# Patient Record
Sex: FEMALE | Race: WHITE
[De-identification: names, ages, dates, MRNs, and addresses within clinical notes are randomized per-mention and may not be internally consistent; named-entity substitution may affect disease eponyms.]

---

## 2017-02-16 ENCOUNTER — HOSPITAL ENCOUNTER (OUTPATIENT)
Dept: HOSPITAL 10 - OBT | Age: 27
Discharge: HOME | End: 2017-02-16
Attending: SPECIALIST
Payer: COMMERCIAL

## 2017-02-16 VITALS — DIASTOLIC BLOOD PRESSURE: 78 MMHG | SYSTOLIC BLOOD PRESSURE: 125 MMHG | HEART RATE: 111 BPM | RESPIRATION RATE: 18 BRPM

## 2017-02-16 VITALS
WEIGHT: 190.48 LBS | BODY MASS INDEX: 34.18 KG/M2 | BODY MASS INDEX: 34.18 KG/M2 | HEIGHT: 62.5 IN | HEIGHT: 62.5 IN | WEIGHT: 190.48 LBS

## 2017-02-16 DIAGNOSIS — Z3A.37: ICD-10-CM

## 2017-02-16 DIAGNOSIS — O26.893: ICD-10-CM

## 2017-02-16 DIAGNOSIS — N89.8: ICD-10-CM

## 2017-02-16 DIAGNOSIS — Z87.891: ICD-10-CM

## 2017-02-16 DIAGNOSIS — O21.2: Primary | ICD-10-CM

## 2017-02-16 LAB
ADD UMIC: YES
BACTERIA #/AREA URNS HPF: (no result) /[HPF]
BASOPHILS # BLD AUTO: 0 10^3/UL (ref 0–0.1)
BASOPHILS NFR BLD: 0.3 % (ref 0–2)
COLOR UR: YELLOW
CONDITION: 1
EOSINOPHIL # BLD: 0 10^3/UL (ref 0–0.5)
EOSINOPHIL NFR BLD: 0.1 % (ref 0–7)
ERYTHROCYTE [DISTWIDTH] IN BLOOD BY AUTOMATED COUNT: 13.2 % (ref 11.5–14.5)
GLUCOSE UR STRIP-MCNC: NEGATIVE %
HCT VFR BLD CALC: 34.3 % (ref 37–47)
HGB BLD-MCNC: 11.6 G/DL (ref 12–16)
KETONES UR STRIP.AUTO-MCNC: NEGATIVE MG/DL
LYMPHOCYTES # BLD AUTO: 2.4 10^3/UL (ref 0.8–2.9)
LYMPHOCYTES NFR BLD AUTO: 25.3 % (ref 15–51)
MCH RBC QN AUTO: 31 PG (ref 29–33)
MCHC RBC AUTO-ENTMCNC: 33.9 G/DL (ref 32–37)
MCV RBC AUTO: 91.6 FL (ref 82–101)
MONOCYTES # BLD: 0.7 10^3/UL (ref 0.3–0.9)
MONOCYTES NFR BLD: 7 % (ref 0–11)
MUCOUS THREADS #/AREA URNS HPF: (no result) /[HPF]
NEUTROPHILS # BLD: 6.5 10^3/UL (ref 1.6–7.5)
NEUTROPHILS NFR BLD AUTO: 67.3 % (ref 39–77)
NITRITE UR QL STRIP.AUTO: NEGATIVE
NRBC # BLD MANUAL: 0 10^3/UL (ref 0–0)
NRBC BLD QL: 0 /100WBC (ref 0–0)
PLATELET # BLD: 164 10^3/UL (ref 140–440)
PMV BLD AUTO: 11.3 FL (ref 7.4–10.4)
RBC # BLD AUTO: 3.75 10^6/UL (ref 4.2–5.4)
RBC # UR AUTO: (no result) /UL
SQUAMOUS #/AREA URNS HPF: (no result) /[HPF]
URINE BILIRUBIN (DIP): NEGATIVE
URINE TOTAL PROTEIN (DIP): (no result)
UROBILINOGEN UR STRIP-ACNC: (no result) (ref 0.1–1)
WBC # BLD AUTO: 9.7 10^3/UL (ref 4.8–10.8)
WBC # BLD: 9.7 10^3/UL (ref 4.8–10.8)
WBC # UR STRIP: (no result) /UL

## 2017-02-16 PROCEDURE — 85025 COMPLETE CBC W/AUTO DIFF WBC: CPT

## 2017-02-16 PROCEDURE — 81001 URINALYSIS AUTO W/SCOPE: CPT

## 2017-02-16 PROCEDURE — 76818 FETAL BIOPHYS PROFILE W/NST: CPT

## 2017-02-16 PROCEDURE — G0463 HOSPITAL OUTPT CLINIC VISIT: HCPCS

## 2017-02-16 PROCEDURE — 36415 COLL VENOUS BLD VENIPUNCTURE: CPT

## 2017-02-16 PROCEDURE — 81003 URINALYSIS AUTO W/O SCOPE: CPT

## 2017-02-16 PROCEDURE — 84112 EVAL AMNIOTIC FLUID PROTEIN: CPT

## 2017-02-16 NOTE — CONS
Date/Time of Note


Date/Time of Note


DATE: 17 


TIME: 19:16





Consultation Date/Type/Reason


Admit Date/Time


 2017


Triage consult


This patient is a 26 years old  3 para 2 with EDC of 2070 which 

makes her 37 weeks and 4 days


She came to OB triage with main complaint nausea vomiting chills watery 

diarrhea and pressure in her abdomen for about 2 days


On examination her blood pressure was 125/78 pulse rate 111 temperature 98.3 

fetal heart rate about 145-150 on physical examination she was afebrile and no 

clear evidence of upper respiratory tract infection however due to vaginal 

discharge that she was complaining ROM plus was performed was negative her CBC 

was within normal urinalysis was negative ultrasound performed


Her biophysical profile biophysical profile with 88 with MATTHEW of 10.5 cm 


IV hydration was given.  Patient feels much better and option of going down to 

emergency room for further workup or going home was presented she decided not 

to go to emergency room and go home she will be seen by her physician 2 days


 Laboratory Tests








Test


  17


15:30 17


17:00


 


Urine Bacteria MODERATE  


 


Urine Bilirubin NEGATIVE  


 


Urine Clarity


  SLIGHTLY


CLOUDY 


 


 


Urine Color YELLOW  


 


Urine Glucose NEGATIVE%  


 


Urine Hemoglobin 2+  


 


Urine Ketones NEGATIVE  


 


Urine Leukocyte Esterase TRACE  


 


Urine Microscopic RBC 10-25/HPF  


 


Urine Microscopic WBC 0-2/HPF  


 


Urine Mucus FEW  


 


Urine Nitrite NEGATIVE  


 


Urine Specific Gravity 1.020  


 


Urine Squamous Epithelial


Cells MODERATE 


  


 


 


Urine Total Protein TRACE  


 


Urine Urobilinogen 0.2  E.U./dL  


 


Urine pH 6.5  


 


Basophils #  0.010^3/ul 


 


Basophils %  0.3% 


 


Blood Morphology Comment   


 


Eosinophils #  0.010^3/ul 


 


Eosinophils %  0.1% 


 


Fetal Membranes Rupture  NEGATIVE 


 


Hematocrit  34.3% 


 


Hemoglobin  11.6g/dl 


 


Lymphocytes #  2.410^3/ul 


 


Lymphocytes %  25.3% 


 


Mean Corpuscular Hemoglobin  31.0pg 


 


Mean Corpuscular Hemoglobin


Concent 


  33.9g/dl 


 


 


Mean Corpuscular Volume  91.6fl 


 


Mean Platelet Volume  11.3fl 


 


Monocytes #  0.710^3/ul 


 


Monocytes %  7.0% 


 


Neutrophils #  6.510^3/ul 


 


Neutrophils %  67.3% 


 


Nucleated Red Blood Cells #  0.010^3/ul 


 


Nucleated Red Blood Cells %  0.0/100WBC 


 


Platelet Count  50718^3/UL 


 


Red Blood Count  3.7510^6/ul 


 


Red Cell Distribution Width  13.2% 


 


White Blood Count  9.710^3/ul 








Constitutional:  no complaints


Eyes:  no complaints


ENT:  no complaints


Respiratory:  no complaints


Cardiovascular:  no complaints


Gastrointestinal:  diarrhea, pain, vomiting


Genitourinary:  no complaints


Musculoskeletal:  no complaints


Skin:  no complaints


Neurologic:  no complaints


Endocrine:  no complaints


Lymphatic:  no complaints


Psychological:  no complaints


Immunologic:  no complaints





Social History


Smoking Status:  Former smoker





Exam/Review of Systems


Vital Signs


Vitals





 Vital Signs








  Date Time  Temp Pulse Resp B/P Pulse Ox O2 Delivery O2 Flow Rate FiO2


 


17 16:12 98.3 111 18 125/78  Room Air  











Results


Result Diagram:  


17 1700





Results 24 hrs





Laboratory Tests








Test


  17


15:30 17


17:00


 


Urine Bacteria MODERATE   


 


Urine Bilirubin NEGATIVE   


 


Urine Clarity


  SLIGHTLY


CLOUDY 


 


 


Urine Color YELLOW   


 


Urine Glucose NEGATIVE   


 


Urine Hemoglobin 2+  H 


 


Urine Ketones NEGATIVE   


 


Urine Leukocyte Esterase TRACE  H 


 


Urine Microscopic RBC 10-25   


 


Urine Microscopic WBC 0-2   


 


Urine Mucus FEW   


 


Urine Nitrite NEGATIVE   


 


Urine Specific Gravity 1.020   


 


Urine Squamous Epithelial


Cells MODERATE  


  


 


 


Urine Total Protein TRACE   


 


Urine Urobilinogen 0.2  E.U./dL   


 


Urine pH 6.5   


 


Basophils #  0.0  


 


Basophils %  0.3  


 


Blood Morphology Comment    


 


Eosinophils #  0.0  


 


Eosinophils %  0.1  


 


Fetal Membranes Rupture  NEGATIVE  


 


Hematocrit  34.3  L


 


Hemoglobin  11.6  L


 


Lymphocytes #  2.4  


 


Lymphocytes %  25.3  


 


Mean Corpuscular Hemoglobin  31.0  


 


Mean Corpuscular Hemoglobin


Concent 


  33.9  


 


 


Mean Corpuscular Volume  91.6  


 


Mean Platelet Volume  11.3  H


 


Monocytes #  0.7  


 


Monocytes %  7.0  


 


Neutrophils #  6.5  


 


Neutrophils %  67.3  


 


Nucleated Red Blood Cells #  0.0  


 


Nucleated Red Blood Cells %  0.0  


 


Platelet Count  164  #


 


Red Blood Count  3.75  L


 


Red Cell Distribution Width  13.2  


 


White Blood Count  9.7  

















SHANTELL ESPANA MD 2017 19:25

## 2017-02-16 NOTE — RADRPT
PROCEDURE:   US OB biophysical profile. 

 

CLINICAL INDICATION:    decreased fetal movements, leaking fluid 

 

TECHNIQUE:   Multiple sonographic images of the pelvis were obtained.  The images were reviewed on a
 PACS workstation. 

 

COMPARISON:   09/22/2016 

 

FINDINGS:

 

There is a single viable intrauterine gestation.  Cardiac activity is present with 123 beats per min
[place]. 

There is a vertex presentation. 

The placenta is anterior.   There is no evidence of placental abruption.

There is a normal amount of amniotic fluid with an MATTHEW = 10.5 cm.

 

Biophysical profile:

Fetal movement 2/2

Fetal tone 2/2.

Fetal breathing 2/2 

MATTHEW 2/2

 

Total 8/8 

 

RPTAT: AA . 

 

IMPRESSION:

Normal biophysical profile.  

 .

_____________________________________________ 

.Alonso George MD, MD           Date    Time 

Electronically viewed and signed by .Alonso George MD, MD on 02/16/2017 17:09 

 

D:  02/16/2017 17:09  T:  02/16/2017 17:09

.S/

## 2017-02-16 NOTE — TRIAGE
===================================

OB Triage

===================================

Datetime Report Generated by CPN: 02/16/2017 20:01

   

   

===========================

Datetime: 02/16/2017 18:42

===========================

   

 Stage of Pregnancy:  OB Triage

   

===================================

Labor Evaluation

===================================

   

 Frequency:  0

 Monitor Mode:  External

 Resting Tone [place]:  Relaxed

   

===================================

Fetal Heart Rate

===================================

   

 FHR Baseline Rate:  120

 Monitor Mode:  External US

 FHR Baseline Changes:  No Baseline Change

 Variability:  Moderate 6-25 bpm

 Accelerations:  15X15

 Decelerations:  None

 Category:  Category I

   

===========================

Datetime: 02/16/2017 18:29

===========================

   

 Stage of Pregnancy:  OB Triage

   

===========================

Datetime: 02/16/2017 18:00

===========================

   

 Stage of Pregnancy:  OB Triage

   

===================================

Labor Evaluation

===================================

   

 Frequency:  0

 Monitor Mode:  External

 Resting Tone [place]:  Relaxed

   

===================================

Fetal Heart Rate

===================================

   

 FHR Baseline Rate:  135

 Monitor Mode:  External US

 FHR Baseline Changes:  No Baseline Change

 Variability:  Moderate 6-25 bpm

 Accelerations:  15X15

 Decelerations:  None

 Category:  Category I

   

===========================

Datetime: 02/16/2017 17:14

===========================

   

   

===================================

Vaginal Exam

===================================

   

 Dilatation (cms):  0.0

 Effacement (%):  50

 Station:  -2

 Exam By:  PAUL LIN

 Vaginal Bleeding:  None

 Cervix, Consistency:  Firm

 Cervix, Position:  Posterior

 Fetal Presentation 'A':  Cephalic

   

===========================

Datetime: 02/16/2017 17:05

===========================

   

 Stage of Pregnancy:  OB Triage

   

===========================

Datetime: 02/16/2017 16:56

===========================

   

 Stage of Pregnancy:  OB Triage

   

===================================

Labor Evaluation

===================================

   

 Frequency:  0

 Monitor Mode:  External

 Resting Tone [place]:  Relaxed

   

===================================

Fetal Heart Rate

===================================

   

 FHR Baseline Rate:  135

 Variability:  Moderate 6-25 bpm

 Accelerations:  15X15

 Decelerations:  None

 Category:  Category I

   

===========================

Datetime: 02/16/2017 16:36

===========================

   

 Stage of Pregnancy:  OB Triage

   

===========================

Datetime: 02/16/2017 16:02

===========================

   

 EGA:  37.4

 Arrived From:  Home

 Fetal Movement:  Present

 Contractions:  Denies/Absent

 Rupture of Membranes:  Unsure

 Vaginal Bleeding:  None

 Vaginal Discharge:  Denies

 Recent Sexual Intercouse:  Denies

 Abdominal Trauma:  Not Applicable

 Patient Complaints:  Nausea; Vomiting

 Provider Notified:  FOROOHAR

   

===========================

Datetime: 02/16/2017 16:01

===========================

   

 Time of Arrival:  02/16/2017 15:21

 Arrived By:  Ambulatory

 Arrived From:  Home

 Chief Complaint:  N/V, CHILLS, DIARRHEA, PRESSURE, LEAKING

 Fetal Movement:  Present

 Contractions:  Denies/Absent

 Rupture of Membranes:  Unsure

 Vaginal Bleeding:  None

 Vaginal Discharge:  Denies

 Recent Sexual Intercouse:  Denies

 Abdominal Trauma:  Not Applicable

 Patient Complaints:  Nausea; Vomiting; Other

 Additional Patient Complaints:  hx of Acid Reflux disease

 Provider Notified:  MALORIE

 Initial Plan:  VS, EFM, ROM+, UA, CBC, BPP

   

===========================

Datetime: 02/16/2017 16:00

===========================

   

 Assessment Type:  Triage

   

===================================

Maternal Assessment

===================================

   

 Level of Consciousness:  Fully Conscious

 DTR's/Clonus:  DTRs 2+; No Clonus

 Headache:  Denies

 Blurred Vision:  No

 Respiratory Effort:  Unlabored

 Breath Sounds, Left:  Clear and Equal

 Breath Sounds, Right:  Clear and Equal

 Nausea/Vomiting:  Present

 RUQ Epigastric Pain:  Denies

 Lower Extremities Edema:  Bilateral Lower Extremities

     Degree:  1+

 Upper Extremities Edema:  None

     Degree:  None

 Facial Edema:  None

   

===================================

Fall Risk Assessment

===================================

   

 History of Falling:  (0) No

 Secondary Diagnosis:  (15) Yes (Annotations: ACID REFLUX DISEASE)

 Ambulatory Aid:  (0) Bedrest/Nurse Assist

 IV Therapy:  (0) No

 Gait:  (0) Normal/Bedrest/Immobile

 Mental Status:  (0) Oriented to Own Ability

 Fall Score:  15

 Fall Risk Score Definition:  No Risk: No action required

   

===========================

Datetime: 02/16/2017 15:55

===========================

   

 Stage of Pregnancy:  OB Triage

 Monitor Mode:  External

 Monitor Mode:  External US

## 2017-02-27 ENCOUNTER — HOSPITAL ENCOUNTER (INPATIENT)
Dept: HOSPITAL 10 - OBT | Age: 27
LOS: 3 days | Discharge: HOME | End: 2017-03-02
Attending: OBSTETRICS & GYNECOLOGY | Admitting: OBSTETRICS & GYNECOLOGY
Payer: COMMERCIAL

## 2017-02-27 VITALS
HEIGHT: 62 IN | BODY MASS INDEX: 34.61 KG/M2 | HEIGHT: 62 IN | WEIGHT: 188.05 LBS | BODY MASS INDEX: 34.61 KG/M2 | WEIGHT: 188.05 LBS

## 2017-02-27 DIAGNOSIS — Z3A.39: ICD-10-CM

## 2017-02-27 PROCEDURE — 36415 COLL VENOUS BLD VENIPUNCTURE: CPT

## 2017-02-27 PROCEDURE — 86901 BLOOD TYPING SEROLOGIC RH(D): CPT

## 2017-02-27 PROCEDURE — 85610 PROTHROMBIN TIME: CPT

## 2017-02-27 PROCEDURE — 86592 SYPHILIS TEST NON-TREP QUAL: CPT

## 2017-02-27 PROCEDURE — 85730 THROMBOPLASTIN TIME PARTIAL: CPT

## 2017-02-27 PROCEDURE — G0463 HOSPITAL OUTPT CLINIC VISIT: HCPCS

## 2017-02-27 PROCEDURE — 90715 TDAP VACCINE 7 YRS/> IM: CPT

## 2017-02-27 PROCEDURE — 85025 COMPLETE CBC W/AUTO DIFF WBC: CPT

## 2017-02-27 PROCEDURE — 62319: CPT

## 2017-02-27 PROCEDURE — 86900 BLOOD TYPING SEROLOGIC ABO: CPT

## 2017-02-28 VITALS — DIASTOLIC BLOOD PRESSURE: 65 MMHG | SYSTOLIC BLOOD PRESSURE: 128 MMHG | RESPIRATION RATE: 18 BRPM | HEART RATE: 78 BPM

## 2017-02-28 VITALS — DIASTOLIC BLOOD PRESSURE: 72 MMHG | RESPIRATION RATE: 18 BRPM | HEART RATE: 48 BPM | SYSTOLIC BLOOD PRESSURE: 129 MMHG

## 2017-02-28 LAB
ADD SCAN DIFF: NO
APTT BLD: 26.4 SEC (ref 25–35)
BASOPHILS # BLD AUTO: 0 10^3/UL (ref 0–0.1)
BASOPHILS NFR BLD: 0.2 % (ref 0–2)
EOSINOPHIL # BLD: 0.1 10^3/UL (ref 0–0.5)
EOSINOPHIL NFR BLD: 0.6 % (ref 0–7)
ERYTHROCYTE [DISTWIDTH] IN BLOOD BY AUTOMATED COUNT: 13 % (ref 11.5–14.5)
HCT VFR BLD CALC: 35.5 % (ref 37–47)
HGB BLD-MCNC: 11.9 G/DL (ref 12–16)
INR PPP: 0.9
LYMPHOCYTES # BLD AUTO: 3 10^3/UL (ref 0.8–2.9)
LYMPHOCYTES NFR BLD AUTO: 33.8 % (ref 15–51)
MCH RBC QN AUTO: 30.8 PG (ref 29–33)
MCHC RBC AUTO-ENTMCNC: 33.5 G/DL (ref 32–37)
MCV RBC AUTO: 92 FL (ref 82–101)
MONOCYTES # BLD: 0.6 10^3/UL (ref 0.3–0.9)
MONOCYTES NFR BLD: 6.2 % (ref 0–11)
NEUTROPHILS # BLD: 5.3 10^3/UL (ref 1.6–7.5)
NEUTROPHILS NFR BLD AUTO: 58.9 % (ref 39–77)
NRBC # BLD MANUAL: 0 10^3/UL (ref 0–0)
NRBC BLD QL: 0 /100WBC (ref 0–0)
PLATELET # BLD: 153 10^3/UL (ref 140–415)
PMV BLD AUTO: 13 FL (ref 7.4–10.4)
PROTHROMBIN TIME: 12.1 SEC (ref 12.2–14.2)
PT RATIO: 0.9
RBC # BLD AUTO: 3.86 10^6/UL (ref 4.2–5.4)
WBC # BLD AUTO: 9 10^3/UL (ref 4.8–10.8)

## 2017-02-28 PROCEDURE — 4A1HX4Z MONITORING OF PRODUCTS OF CONCEPTION, CARDIAC ELECTRICAL ACTIVITY, EXTERNAL APPROACH: ICD-10-PCS | Performed by: SPECIALIST

## 2017-02-28 RX ADMIN — Medication SCH MLS/HR: at 21:12

## 2017-02-28 RX ADMIN — PYRIDOXINE HYDROCHLORIDE SCH MLS/HR: 100 INJECTION, SOLUTION INTRAMUSCULAR; INTRAVENOUS at 15:31

## 2017-02-28 RX ADMIN — PYRIDOXINE HYDROCHLORIDE SCH MLS/HR: 100 INJECTION, SOLUTION INTRAMUSCULAR; INTRAVENOUS at 02:20

## 2017-02-28 RX ADMIN — Medication SCH MLS/HR: at 22:00

## 2017-02-28 RX ADMIN — PYRIDOXINE HYDROCHLORIDE SCH MLS/HR: 100 INJECTION, SOLUTION INTRAMUSCULAR; INTRAVENOUS at 08:27

## 2017-02-28 NOTE — HP
Date/Time of Note


Date/Time of Note


DATE: 17 


TIME: 03:01





OB - History


Hx of Present Pregnancy


Free Text/Dictation


26 Year-old  with SIUP at 39 weeks presents with a chief complaint of 

uterine contractions.  She was seen at [place] last night and her exam was 2-3/

60/-2 there. She has been receiving her prenatal care with an OB clinic.  She 

states good fetal movement.  She denies nausea, vomiting, shortness of breath, 

chest pain, and abdominal pain between contractions, headache, visual changes, 

vaginal bleeding or LOF.


Chief Complaint:  uterine contractions


:  3


Para:  2


Spontaneous :  0


Therapeutic :  0


Prenatal Care:  Good Care


Ultrasounds:  Normal mid trimester US


Obstetrical Complications:  None


Medical Complications:  None





Past Family/Social History


*


Past Medical, Surgical, Family and Obstetric Histories reviewed from prenatal 

chart.


Blood Type:  O-


Rubella:  immune


RPR/VDRL:  Negative


GBS Status:  Negative


HBsAG:  Negative





OB  Admission Exam


Vital Signs


Vital Signs





 Vital Signs








  Date Time  Temp Pulse Resp B/P Pulse Ox O2 Delivery O2 Flow Rate FiO2


 


17 00:31 97.6 48 18 129/72  Room Air  











Physical Exam


HEENT:  WNL


Heart:  Rhythm Normal


Lungs:  Clear


Abdomen:  WNL


Extremities:  Normal


Cervical Dilatation:  4cm


Effacement:  75%


Station:  -2


Membranes:  Intact


Fetal Heart Rate:  140's


Accelerations:  Accelerations Present


Decelerations:  No Decelerations


Varibility:  Moderate


Contractions on Admission:  < 5 Minutes Apart


Intensity:  Moderate


Last 72 hours Lab Results


 CBC & BMP


17 01:55











OB  Assessment/Plan


Other plan:


26 Year-old  with SIUP at 39 weeks in labor.  


- FHR: No sign of fetal metabolic acidosis- Category I


- Continious EFM, toco


- CBC, blood type and screen


- Analgesia options with R/B/A discussed in detail with patient


- Epidural per patient request


- Please see the orders


- O neg/Rubella: Immune/GBS negative  





Admission, procedures, expectations, risks and possible complications have been 

discussed in detail with the patient. Risk of vaginal delivery including but 

not limited to bleeding, infection, cervical laceration, placental retention, 

injury to fetus, blood transfusion, blood transfusion related infection, risk 

of anesthesia, adhesion, cervical laceration, episiotomy/laceration, possible 

 delivery with risk of bleeding, infection, injury to other organs  (

bowel, bladder, ureter, vessels, nerves), injury to fetus, blood transfusion, 

blood transfusion related infection, risk of anesthesia, scar and hernia 

formation, needs for future , removal of uterus or any other indicated 

surgery discussed with the patient. She expressed understanding and repeats the 

risks. All of her questions were answered; all appropriate consents will be 

signed.





PHYSICIAN'S VERIFICATION OF INFORMED CONSENT:


The patient was counseled regarding the procedure, its indications, risks, 

potential complications and alternatives and any questions were answered. 

Consent was obtained.





PLANNED PROCEDURE/TREATMENT:  


Vaginal delivery with possible vacuum/forceps delivery episiotomy, repair of 

laceration possible  delivery





PHYSICIAN'S VERIFICATION OF INFORMED CONSENT FOR BLOOD TRANSFUSION:


There is a reasonable possibility that blood transfusion will be necessary as a 

result of the patient's procedure.  I have discussed the following with the 

patient/patient's legal representative:  An explanation of the benefits and 

risks of the transfusion of blood or blood products and the possible 

alternatives.  Al questions have been answered to the patient's/patients legal 

representatives satisfaction.


INFORMED CONSENT: The patient has been informed of:


- The nature of the proposed care, treatment, services, medic- Potential 

benefits, risks or side effects, including potential problems related to 

recuperation.


- The likelihood of achieving care treatment and service goals.


- Reasonable alternatives to the proposed care, treatment and service.


- The relevant risks, benefits and side effects related to alternatives, 

including the possible results of not receiving care, treatment and services.


- When indicated, any limitations on the confidentiality of information learned 

from or about the patient.


- If appropriate, the risks, benefits and alternatives of the drugs to be used 

for sedation/analgesia including moderate sedation.


- If appropriate, patient has been provided information on the risks, benefits 

and alternatives to the transfusion of blood and/or blood products.











PATRICIA FRANCO 2017 03:06

## 2017-02-28 NOTE — LDN
Date/Time of Note


Date/Time of Note


DATE: 17 


TIME: 17:27





Delivery Summary


2017


Delivery note


This patient is a 26 years old  3 para 2 with the estimated date of 

confinement of 2017.


She was admitted late last night in early labor and made gradual progress to 

complete dilatation under epidural block and Pitocin augmentation and had an 

spontaneous vaginal delivery 20 minutes ago the  was female with Apgar 

score of 9 and 1 minute 9 at 5 minutes weight of the baby was 6 pounds and 10 

admission with occiput posterior position mothers blood type is O- we will be 

performing a test of the blood group of the baby as well


Placenta Delivered:  Spontaneously


Meconium:  none


Perineum intact?:  Yes


Anesthesia type:  Epidural


Estimated blood loss:  250


Sponge & Needle done & correct:  Yes


All needle counts correct:  Yes


Any foreign bodies felt in the:  No


Problems:  





Infant Delivery Information


Sex


Infant Sex:  female





Apgars


1 Minute:  9


5 Minute:  9





Suctioning


Nose & mouth suctioned at [place]:  Yes


Delee suction performed:  No





Umbilical Cord


Umbilical cord with:  3 Vessels


Cord presentations:  nuchal cord


Nuchal cord present X:  1


Cord Blood was obtained:  Yes





Mother & Baby Disposition


Disposition


Mom & Baby to Maternity; Good:  Yes


Baby to NICU:  No











SHANTELL ESPANA MD 2017 17:33

## 2017-02-28 NOTE — TRIAGE
===================================

OB Triage

===================================

Datetime Report Generated by CPN: 02/28/2017 04:17

   

   

===========================

Datetime: 02/28/2017 02:15

===========================

   

 Assessment Type:  Admission Assessment

 Vaginal Bleeding:  Scant

   

===================================

Maternal Assessment

===================================

   

 Level of Consciousness:  Fully Conscious

 DTR's/Clonus:  DTRs 2+; No Clonus

 Headache:  Denies

 Blurred Vision:  No

 Respiratory Effort:  Unlabored

 Breath Sounds, Left:  Clear and Equal

 Breath Sounds, Right:  Clear and Equal

 Nausea/Vomiting:  Denies

 RUQ Epigastric Pain:  Denies

 Lower Extremities Edema:  Bilateral Lower Extremities

     Degree:  1+

 Upper Extremities Edema:  None

     Degree:  None

 Facial Edema:  None

   

===================================

Fall Risk Assessment

===================================

   

 History of Falling:  (0) No

 Secondary Diagnosis:  (0) No

 Ambulatory Aid:  (0) Bedrest/Nurse Assist

 IV Therapy:  (0) No

 Gait:  (0) Normal/Bedrest/Immobile

 Mental Status:  (0) Oriented to Own Ability

 Fall Score:  0

 Fall Risk Score Definition:  No Risk: No action required

   

===================================

Pain Assessment

===================================

   

 Pain Scale:  7

 Pain Presence:  Intermittent

 Pain Type:  Contraction

 Pain Location:  Back

 Pain Goal:  0

 Membrane Status:  Intact

   

===========================

Datetime: 02/28/2017 02:00

===========================

   

   

===================================

Labor Evaluation

===================================

   

 Frequency:  Irregular

 Monitor Mode:  External

 Duration (sec)2399:  40-80

 Quality:  Mild

 Pattern:  Normal: <= 5 Contractions in 10 Minutes

 Resting Tone [place]:  Relaxed

   

===================================

Fetal Heart Rate

===================================

   

 FHR Baseline Rate:  115

 Monitor Mode:  External US

 Variability:  Moderate 6-25 bpm

 Accelerations:  15X15

 Decelerations:  None

 Category:  Category I

   

===========================

Datetime: 02/28/2017 01:55

===========================

   

 Stage of Pregnancy:  Labor

   

===========================

Datetime: 02/28/2017 01:47

===========================

   

 Membrane Status:  Intact

   

===========================

Datetime: 02/28/2017 01:19

===========================

   

 Stage of Pregnancy:  OB Triage

   

===========================

Datetime: 02/28/2017 01:00

===========================

   

   

===================================

Labor Evaluation

===================================

   

 Frequency:  3-9

 Monitor Mode:  External

 Duration (sec)2399:  40-90

 Quality:  Mild

 Pattern:  Normal: <= 5 Contractions in 10 Minutes

 Resting Tone [place]:  Relaxed

   

===================================

Fetal Heart Rate

===================================

   

 FHR Baseline Rate:  120

 Monitor Mode:  External US

 Variability:  Moderate 6-25 bpm

 Accelerations:  15X15

 Decelerations:  None

 Category:  Category I

   

===========================

Datetime: 02/28/2017 00:25

===========================

   

   

===================================

Vaginal Exam

===================================

   

 Dilatation (cms):  4.0

 Effacement (%):  80

 Station:  -2

 Exam By:  DGS RN

 Vaginal Bleeding:  Normal Show

 Cervix, Consistency:  Soft

 Cervix, Position:  Posterior

   

===========================

Datetime: 02/28/2017 00:15

===========================

   

 Assessment Type:  Triage

   

===================================

Maternal Assessment

===================================

   

 Level of Consciousness:  Fully Conscious

 DTR's/Clonus:  DTRs 2+; No Clonus

 Headache:  Denies

 Blurred Vision:  No

 Respiratory Effort:  Unlabored; Regular Rhythm; Equal Expansion

 Nausea/Vomiting:  Denies

 RUQ Epigastric Pain:  Denies

 Lower Extremities Edema:  None

     Degree:  None

 Upper Extremities Edema:  None

     Degree:  None

 Facial Edema:  None

   

===================================

Fall Risk Assessment

===================================

   

 History of Falling:  (0) No

 Secondary Diagnosis:  (0) No

 Ambulatory Aid:  (0) Bedrest/Nurse Assist

 IV Therapy:  (0) No

 Gait:  (0) Normal/Bedrest/Immobile

 Mental Status:  (0) Oriented to Own Ability

 Fall Score:  0

 Fall Risk Score Definition:  No Risk: No action required

   

===========================

Datetime: 02/28/2017 00:13

===========================

   

 Stage of Pregnancy:  OB Triage

 Temperature Route:  Oral

   

===================================

Pain Assessment

===================================

   

 Pain Scale:  7

 Pain Presence:  Intermittent

 Pain Type:  Cramping (Annotations: TIGHTENING)

 Pain Location:  Abdomen; Perineum

 Pain Goal:  3

 Pain Relief Measures:  Comfort Measures

   

===========================

Datetime: 02/28/2017 00:11

===========================

   

 Monitor Mode:  External

 Contraction Comments:  [place] applied

   

===================================

Fetal Heart Rate

===================================

   

 FHR Baseline Rate:  120

 Monitor Mode:  External US

 Comments:  EFM applied

   

===========================

Datetime: 02/27/2017 23:51

===========================

   

 Time of Arrival:  02/27/2017 23:51

 EGA:  38.6

 Arrived By:  Ambulatory

 Arrived From:  Other Hospital

 Chief Complaint:  UC'S, SPOTTING

 Fetal Movement:  Present

 Contractions:  Irregular

 Time Contractions Began:  02/27/2017 22:15

 Rupture of Membranes:  Denies

 Vaginal Bleeding:  Normal Show

 Vaginal Discharge:  Present

 Recent Sexual Intercouse:  Denies

 Abdominal Trauma:  Not Applicable

 Patient Complaints:  Contractions

 Additional Patient Complaints:  Pt states she just came from [facility] b/c they refused to 
admit her. Per pt  saw pt in clinic today (2/27) _ MD stated she was 3-4cm _ should go to hosp
ital if had any pain or uc's. Pt states VE was 3-4cm at [facility] with bloody show. Per [facility] RN V
E was 2-3cm with no uc's over 1hr. Pt refuses to be sent home.

 Time Provider Notified:  02/28/2017 01:19

 Provider Notified:  Dr.Hadadian

   

===========================

Datetime: 02/16/2017 16:02

===========================

   

 EGA:  37.2

   

===========================

Datetime: 02/16/2017 16:00

===========================

   

 Fall Score:  15

 Fall Risk Score Definition:  No Risk: No action required

## 2017-03-01 VITALS — DIASTOLIC BLOOD PRESSURE: 69 MMHG | RESPIRATION RATE: 16 BRPM | SYSTOLIC BLOOD PRESSURE: 116 MMHG | HEART RATE: 70 BPM

## 2017-03-01 VITALS — HEART RATE: 44 BPM | SYSTOLIC BLOOD PRESSURE: 129 MMHG | RESPIRATION RATE: 18 BRPM | DIASTOLIC BLOOD PRESSURE: 63 MMHG

## 2017-03-01 VITALS — HEART RATE: 71 BPM | SYSTOLIC BLOOD PRESSURE: 113 MMHG | RESPIRATION RATE: 18 BRPM | DIASTOLIC BLOOD PRESSURE: 82 MMHG

## 2017-03-01 VITALS — SYSTOLIC BLOOD PRESSURE: 113 MMHG | DIASTOLIC BLOOD PRESSURE: 65 MMHG | RESPIRATION RATE: 18 BRPM | HEART RATE: 55 BPM

## 2017-03-01 VITALS — RESPIRATION RATE: 16 BRPM | SYSTOLIC BLOOD PRESSURE: 127 MMHG | HEART RATE: 58 BPM | DIASTOLIC BLOOD PRESSURE: 71 MMHG

## 2017-03-01 LAB
ADD SCAN DIFF: NO
BASOPHILS # BLD AUTO: 0 10^3/UL (ref 0–0.1)
BASOPHILS NFR BLD: 0.1 % (ref 0–2)
EOSINOPHIL # BLD: 0 10^3/UL (ref 0–0.5)
EOSINOPHIL NFR BLD: 0.4 % (ref 0–7)
ERYTHROCYTE [DISTWIDTH] IN BLOOD BY AUTOMATED COUNT: 13 % (ref 11.5–14.5)
HCT VFR BLD CALC: 31.6 % (ref 37–47)
HGB BLD-MCNC: 10.6 G/DL (ref 12–16)
LYMPHOCYTES # BLD AUTO: 2.5 10^3/UL (ref 0.8–2.9)
LYMPHOCYTES NFR BLD AUTO: 24.5 % (ref 15–51)
MCH RBC QN AUTO: 30.9 PG (ref 29–33)
MCHC RBC AUTO-ENTMCNC: 33.5 G/DL (ref 32–37)
MCV RBC AUTO: 92.1 FL (ref 82–101)
MONOCYTES # BLD: 0.6 10^3/UL (ref 0.3–0.9)
MONOCYTES NFR BLD: 5.8 % (ref 0–11)
NEUTROPHILS # BLD: 6.9 10^3/UL (ref 1.6–7.5)
NEUTROPHILS NFR BLD AUTO: 68.9 % (ref 39–77)
NRBC # BLD MANUAL: 0 10^3/UL (ref 0–0)
NRBC BLD QL: 0 /100WBC (ref 0–0)
PLATELET # BLD: 153 10^3/UL (ref 140–415)
PMV BLD AUTO: 12.9 FL (ref 7.4–10.4)
RBC # BLD AUTO: 3.43 10^6/UL (ref 4.2–5.4)
WBC # BLD AUTO: 10 10^3/UL (ref 4.8–10.8)

## 2017-03-01 RX ADMIN — IBUPROFEN PRN MG: 600 TABLET ORAL at 17:59

## 2017-03-01 RX ADMIN — IBUPROFEN PRN MG: 600 TABLET ORAL at 05:52

## 2017-03-01 RX ADMIN — IBUPROFEN PRN MG: 600 TABLET ORAL at 12:50

## 2017-03-01 NOTE — PN
Date/Time of Note


Date/Time of Note


DATE: 3/1/17 


TIME: 17:05





OB Subjective


Subjective


Subjective


Patient reports vaginal bleeding decrease.  She is currently breast-feeding.  

Ambulating.  Passed flatus.  Urinated.  Denies any dizziness or any other 

complaint.


She requests postpartum tubal sterilization.  She has signed consent for 

postpartum tubal sterilization at her prenatal course and consent is available 

currently.





OB Objective


Objective


Objective


Physical examination: General appearance patient is alert and oriented 4 and 

is not in acute distress.





Abdomen: Soft, obese, no tenderness, no rebound tenderness, no guarding, no 

rigidity.  Fundus palpable at the level of umbilicus.





No fundal tenderness.





Extremities: 2+ nonpitting bilateral symmetric edema.  No calf tenderness, no 

edema and no cords palpable.





 Hematology - 72 Hrs








Test


  17


01:55 3/1/17


09:09


 


Basophils #


  0.010^3/ul


(0.0-0.1) 0.010^3/ul


(0.0-0.1)


 


Basophils %


  0.2% (0.0-2.0)


  0.1% (0.0-2.0)


 


 


Eosinophils #


  0.110^3/ul


(0.0-0.5) 0.010^3/ul


(0.0-0.5)


 


Eosinophils %


  0.6% (0.0-7.0)


  0.4% (0.0-7.0)


 


 


Hematocrit


  35.5%


(37.0-47.0)  L 31.6%


(37.0-47.0)  L


 


Hemoglobin


  11.9g/dl


(12.0-16.0)  L 10.6g/dl


(12.0-16.0)  L


 


Lymphocytes #


  3.010^3/ul


(0.8-2.9)  H 2.510^3/ul


(0.8-2.9)


 


Lymphocytes %


  33.8%


(15.0-51.0) 24.5%


(15.0-51.0)


 


Mean Corpuscular Hemoglobin


  30.8pg


(29.0-33.0) 30.9pg


(29.0-33.0)


 


Mean Corpuscular Hemoglobin


Concent 33.5g/dl


(32.0-37.0) 33.5g/dl


(32.0-37.0)


 


Mean Corpuscular Volume


  92.0fl


(82.0-101.0) 92.1fl


(82.0-101.0)


 


Mean Platelet Volume


  13.0fl


(7.4-10.4)  H 12.9fl


(7.4-10.4)  H


 


Monocytes #


  0.610^3/ul


(0.3-0.9) 0.610^3/ul


(0.3-0.9)


 


Monocytes %


  6.2%


(0.0-11.0) 5.8%


(0.0-11.0)


 


Neutrophils #


  5.310^3/ul


(1.6-7.5) 6.910^3/ul


(1.6-7.5)


 


Neutrophils %


  58.9%


(39.0-77.0) 68.9%


(39.0-77.0)


 


Nucleated Red Blood Cells #


  0.010^3/ul


(0.0-0.0) 0.010^3/ul


(0.0-0.0)


 


Nucleated Red Blood Cells %


  0.0/100WBC


(0.0-0.0) 0.0/100WBC


(0.0-0.0)


 


Platelet Count


  89608^3/UL


(140-415) 56619^3/UL


(140-415)


 


Red Blood Count


  3.8610^6/ul


(4.20-5.40)  L 3.4310^6/ul


(4.20-5.40)  L


 


Red Cell Distribution Width


  13.0%


(11.5-14.5) 13.0%


(11.5-14.5)


 


White Blood Count


  9.010^3/ul


(4.8-10.8) 10.010^3/ul


(4.8-10.8)











OB  Assessment/Plan


Other Assessment:


Status post 


Postpartum day #1


Doing well


Request postpartum tubal sterilization.  Risk and benefit of procedure 

including risk of infection, bleeding damage to surrounding structures 

including bowel and bladder and risk of failure less than 1% risk of ectopic 

pregnancy for any failure occurs discussed the patient in detail and informed 

consent was obtained.  Patient verbalized understanding.


She has currently had lunch.  Plan to schedule for tomorrow for postpartum for 

stabilization.


All questions were answered the patient's best satisfaction











RONN STEEN MD Mar 1, 2017 17:07

## 2017-03-02 VITALS — DIASTOLIC BLOOD PRESSURE: 50 MMHG | SYSTOLIC BLOOD PRESSURE: 117 MMHG | HEART RATE: 80 BPM | RESPIRATION RATE: 20 BRPM

## 2017-03-02 VITALS — SYSTOLIC BLOOD PRESSURE: 116 MMHG | RESPIRATION RATE: 18 BRPM | HEART RATE: 52 BPM | DIASTOLIC BLOOD PRESSURE: 75 MMHG

## 2017-03-02 RX ADMIN — IBUPROFEN PRN MG: 600 TABLET ORAL at 00:05

## 2017-03-02 RX ADMIN — IBUPROFEN PRN MG: 600 TABLET ORAL at 12:35

## 2017-03-02 NOTE — DS
Date/Time of Note


Date/Time of Note


DATE: 3/2/17 


TIME: 11:42





Obstetrical Discharge Record


Final Diagnosis


Final Diagnosis:  Term delivered





Vaginal Delivery


Obstetrical Delivery:  Spontaneous





Complications


Augmentation:  Yes





Condition on Discharge


Physical Assessment


Last Vitals:





 Current Medications








 Medications


  (Trade)  Dose


 Ordered  Sig/Maxim


 Route


 PRN Reason  Start Time


 Stop Time Status Last Admin


Dose Admin


 


 Lactated Ringer's


  (Lr)  1,000 ml @ 


 125 mls/hr  Q8H


 IV


   2/28/17 01:37


 2/28/17 21:14 DC 2/28/17 15:31


 


 


 Butorphanol


 Tartrate


  (Stadol)  2 mg  Q2H  PRN


 IV


 PAIN  2/28/17 02:00


 2/28/17 21:14 DC  


 


 


 Lidocaine 30 ml  30 ml  ONCE PRN


 INJ


 EPISIOTOMY/TEARING  2/28/17 02:00


 2/28/17 21:14 DC  


 


 


 Oxytocin/Lactated


 Ringer's  500 ml @ 


 125 mls/hr  ONCE -MAY REPEAT X1


 IV


   2/28/17 02:00


 2/28/17 21:14 DC 2/28/17 17:42


 


 


 Oxytocin/Lactated


 Ringer's  500 ml @ 


 125 mls/hr  ONCE


 IV


   2/28/17 02:00


 2/28/17 21:14 DC  


 


 


 Ibuprofen 600 mg  600 mg  ONCE PRN


 PO


 Mild Pain (Pain Score 1-3)  2/28/17 02:00


 2/28/17 21:15 DC  


 


 


 Lactated Ringer's  1,000 ml @ 


 2,000 mls/hr  Q30M PRN


 IV


 PRE-EPIDURAL BOLUS  2/28/17 02:00


 2/28/17 21:15 DC 2/28/17 04:28


 


 


 Oxytocin/Lactated


 Ringer's  500 ml @ 0


 mls/hr  ONCE PRN


 IV


 For Hemorrhage Management  2/28/17 02:00


 2/28/17 21:15 DC 2/28/17 06:21


 


 


 Methylergonovine


 Maleate


  (Methergine)  0.2 mg  ONCE PRN


 IM


 VAGINAL BLEEDING  2/28/17 02:00


 2/28/17 21:15 DC  


 


 


 Carboprost


 Tromethamine


  (Hemabate)  250 mcg  ONCE PRN


 IM


 VAGINAL BLEEDING  2/28/17 02:00


 2/28/17 21:15 DC  


 


 


 Misoprostol 1000


 mcg  1,000 mcg  ONCE PRN


 FL


 VAGINAL BLEEDING  2/28/17 02:00


 2/28/17 21:15 DC  


 


 


 Fentanyl/


 Ropivacaine  100 ml @ ud  STK-MED ONCE


 .ROUTE


   2/28/17 05:13


 2/28/17 05:14 DC  


 


 


 Naloxone HCl


  (Narcan)  0.2 mg  Q2M  PRN


 IV


 FOR RESP RATE 8 OR LESS  2/28/17 06:00


 2/28/17 21:15 DC  


 


 


 Fentanyl/


 Ropivacaine 100 ml  100 ml  EPIDURAL (PCEA)


 EPI


   2/28/17 06:00


 2/28/17 21:15 DC 2/28/17 11:45


 


 


 Lactated Ringer's  1,000 ml @ 


 125 mls/hr  Q8H


 IV*


   2/28/17 17:33


 2/28/17 21:15 DC  


 


 


 Oxytocin/Lactated


 Ringer's  500 ml @ 0


 mls/hr  ONCE PRN


 IV


 For Hemorrhage Management  2/28/17 18:00


 3/1/17 07:47 DC  


 


 


 Methylergonovine


 Maleate


  (Methergine)  0.2 mg  ONCE PRN


 IM


 VAGINAL BLEEDING  2/28/17 18:00


 2/28/17 21:18 DC  


 


 


 Carboprost


 Tromethamine


  (Hemabate)  250 mcg  ONCE PRN


 IM


 VAGINAL BLEEDING  2/28/17 18:00


 2/28/17 21:18 DC  


 


 


 Misoprostol 1000


 mcg  1,000 mcg  ONCE PRN


 FL


 VAGINAL BLEEDING  2/28/17 18:00


 2/28/17 21:19 DC  


 


 


 Oxytocin/Lactated


 Ringer's  500 ml @ 


 125 mls/hr  Q4H


 IV


   2/28/17 17:33


 3/1/17 07:47 DC  


 


 


 Ibuprofen


  (Motrin)  600 mg  Q6H  PRN


 PO


 PAIN  2/28/17 18:00


 2/28/17 21:18 DC 2/28/17 20:39


 


 


 Acetaminophen


  (Tylenol Tab)  500 mg  Q6H  PRN


 PO


 PAIN AND OR ELEVATED TEMP  2/28/17 18:00


 2/28/17 21:18 DC  


 


 


 Oxycodone/Aspirin


  (Percodan)  1 tab  Q3H  PRN


 PO


 PAIN LEVEL 1-5  2/28/17 21:30


    3/1/17 10:13


 


 


 Oxycodone/Aspirin


  (Percodan)  2 tab  Q3H  PRN


 PO


 PAIN LEVEL 6-10  2/28/17 21:30


     


 


 


 Senna/Docusate


 Sodium


  (Senokot-S)  1 tab  BID  PRN


 PO


 CONSTIPATION  2/28/17 21:30


     


 


 


 Witch Hazel/


 Glycerin


  (Tucks Pads)  1 pad  BEDSIDE MEDICATION  PRN


 FL


 HEMORRHOID/EPISIOTMY PAIN  2/28/17 21:30


     


 


 


 Benzocaine


  (Dermoplast


 Spray)  1 spray  BEDSIDE MEDICATION  PRN


 TOP


 HEMORRHOID/EPISIOTMY PAIN  2/28/17 21:30


    2/28/17 21:59


 


 


 Dibucaine


  (Nupercainal)  1 applic  BEDSIDE MEDICATION  PRN


 FL


 HEMORRHOID/EPISIOTMY PAIN  2/28/17 21:30


     


 


 


 Lanolin


  (Tin-O-Soothe)  1 applic  BEDSIDE MEDICATION  PRN


 TOP


 BEDSIDE FOR GEORGI TO NIPPLES  2/28/17 21:30


    2/28/17 21:58


 


 


 Diphtheria/


 Tetanus/Acell


 Pertussis 0.5 ml  0.5 ml  ONCE ONCE


 IM*


   3/2/17 09:00


 3/2/17 09:01 DC  


 


 


 Oxytocin/Lactated


 Ringer's  500 ml @ 0


 mls/hr  ONCE PRN


 IV


 For Hemorrhage Management  2/28/17 21:30


 3/1/17 07:47 DC  


 


 


 Methylergonovine


 Maleate


  (Methergine)  0.2 mg  ONCE PRN


 IM


 VAGINAL BLEEDING  2/28/17 21:30


     


 


 


 Carboprost


 Tromethamine


  (Hemabate)  250 mcg  ONCE PRN


 IM


 VAGINAL BLEEDING  2/28/17 21:30


     


 


 


 Misoprostol 1000


 mcg  1,000 mcg  ONCE PRN


 FL


 VAGINAL BLEEDING  2/28/17 21:30


     


 


 


 Oxytocin/Lactated


 Ringer's  500 ml @ 


 125 mls/hr  Q4H


 IV


   2/28/17 21:12


 3/1/17 07:47 DC 2/28/17 22:00


 


 


 Ibuprofen


  (Motrin)  600 mg  Q6H  PRN


 PO


 PAIN  2/28/17 21:30


    3/2/17 00:05


 


 


 Acetaminophen 500


 mg  500 mg  Q6H  PRN


 PO


 PAIN AND OR ELEVATED TEMP  2/28/17 21:30


    3/1/17 07:34


 


 


 Lactated Ringer's


  (Lr)  1,000 ml @ 


 125 mls/hr  Q8H


 IV


   3/2/17 00:01


     


 








Voiding:  Yes


Bowel Movement:  Yes


Breast:  Soft, non-tender


Fundus:  Firm


Calf Tenderness:  No


Patient Condition:  Good











SHANTELL ESPANA MD Mar 2, 2017 11:45

## 2017-07-12 ENCOUNTER — HOSPITAL ENCOUNTER (EMERGENCY)
Dept: HOSPITAL 10 - FTE | Age: 27
Discharge: HOME | End: 2017-07-12
Payer: COMMERCIAL

## 2017-07-12 VITALS
WEIGHT: 184.09 LBS | HEIGHT: 65 IN | HEIGHT: 65 IN | BODY MASS INDEX: 30.67 KG/M2 | BODY MASS INDEX: 30.67 KG/M2 | WEIGHT: 184.09 LBS

## 2017-07-12 VITALS
HEART RATE: 69 BPM | TEMPERATURE: 98 F | SYSTOLIC BLOOD PRESSURE: 115 MMHG | RESPIRATION RATE: 20 BRPM | DIASTOLIC BLOOD PRESSURE: 72 MMHG

## 2017-07-12 DIAGNOSIS — F41.9: ICD-10-CM

## 2017-07-12 DIAGNOSIS — N30.00: Primary | ICD-10-CM

## 2017-07-12 LAB
ADD SCAN DIFF: NO
ANION GAP SERPL CALC-SCNC: 12 MMOL/L (ref 8–16)
BASOPHILS # BLD AUTO: 0 10^3/UL (ref 0–0.1)
BASOPHILS NFR BLD: 0.3 % (ref 0–2)
BUN SERPL-MCNC: 13 MG/DL (ref 7–20)
CALCIUM SERPL-MCNC: 9.5 MG/DL (ref 8.4–10.2)
CHLORIDE SERPL-SCNC: 103 MMOL/L (ref 97–110)
CO2 SERPL-SCNC: 26 MMOL/L (ref 21–31)
CREAT SERPL-MCNC: 0.76 MG/DL (ref 0.44–1)
EOSINOPHIL # BLD: 0.1 10^3/UL (ref 0–0.5)
EOSINOPHIL NFR BLD: 0.6 % (ref 0–7)
ERYTHROCYTE [DISTWIDTH] IN BLOOD BY AUTOMATED COUNT: 12.4 % (ref 11.5–14.5)
GLUCOSE SERPL-MCNC: 95 MG/DL (ref 70–220)
HCT VFR BLD CALC: 41.1 % (ref 37–47)
HGB BLD-MCNC: 13.8 G/DL (ref 12–16)
LYMPHOCYTES # BLD AUTO: 2.2 10^3/UL (ref 0.8–2.9)
LYMPHOCYTES NFR BLD AUTO: 22.7 % (ref 15–51)
MCH RBC QN AUTO: 31.4 PG (ref 29–33)
MCHC RBC AUTO-ENTMCNC: 33.6 G/DL (ref 32–37)
MCV RBC AUTO: 93.4 FL (ref 82–101)
MONOCYTES # BLD: 0.5 10^3/UL (ref 0.3–0.9)
MONOCYTES NFR BLD: 5.4 % (ref 0–11)
NEUTROPHILS # BLD: 6.8 10^3/UL (ref 1.6–7.5)
NEUTROPHILS NFR BLD AUTO: 70.7 % (ref 39–77)
NRBC # BLD MANUAL: 0 10^3/UL (ref 0–0)
NRBC BLD QL: 0 /100WBC (ref 0–0)
PLATELET # BLD: 262 10^3/UL (ref 140–415)
PMV BLD AUTO: 10.7 FL (ref 7.4–10.4)
POTASSIUM SERPL-SCNC: 3.7 MMOL/L (ref 3.5–5.1)
RBC # BLD AUTO: 4.4 10^6/UL (ref 4.2–5.4)
SODIUM SERPL-SCNC: 137 MMOL/L (ref 135–144)
TROPONIN I SERPL-MCNC: < 0.012 NG/ML (ref 0–0.12)
WBC # BLD AUTO: 9.7 10^3/UL (ref 4.8–10.8)

## 2017-07-12 PROCEDURE — 71010: CPT

## 2017-07-12 PROCEDURE — 36415 COLL VENOUS BLD VENIPUNCTURE: CPT

## 2017-07-12 PROCEDURE — 81003 URINALYSIS AUTO W/O SCOPE: CPT

## 2017-07-12 PROCEDURE — 93005 ELECTROCARDIOGRAM TRACING: CPT

## 2017-07-12 PROCEDURE — 84484 ASSAY OF TROPONIN QUANT: CPT

## 2017-07-12 PROCEDURE — 85025 COMPLETE CBC W/AUTO DIFF WBC: CPT

## 2017-07-12 PROCEDURE — 80048 BASIC METABOLIC PNL TOTAL CA: CPT

## 2017-07-12 NOTE — ERD
ER Documentation


Chief Complaint


Date/Time


DATE: 17 


TIME: 12:33


Chief Complaint


PT FEELS SOB  AFTER DRINKING ENERGY DRINK , TALKING IN FULL SENTENCES





HPI


This is a 27-year-old female with no past medical history presenting to the 

emergency department for feeling short of breath and heart palpitations 

starting earlier today.  Patient states she had an energy drink this morning 

around 7 AM.  Patient states 1 hour later she developed heart palpitations and 

felt short of breath.  Patient states she was sitting in class and she started 

to feel weak and states she felt anemia "like I was about to faint."  No 

syncopal episode.  Patient states that she normally drinks an energy drink 

every morning with 2 cups of coffee in the evening.  Patient states she is 

sensitive to caffeine and is unsure if this is related.  Denies any increase in 

stress at home or school.  Patient states her sister suffers from severe 

anxiety and she states, "I never want to become that."  Last menstrual period 

was about 1 month ago.  Patient states she is on the Depo shot.





Currently no chest pain, shortness of breath or difficulty breathing.  No 

drooling or difficulty swallowing.  No headache.  No weakness.  Patient is 

talking in complete sentences





ROS


All systems reviewed and are negative except as per history of present illness.





Medications


Home Meds


Active Scripts


Nitrofurantoin Monohyd Macrocr (Macrobid) 100 Mg Capsr, 100 MG PO BID for 5 Days

, CAP


   Prov:ERIC CASTRO NP         17





Allergies


Allergies:  


Coded Allergies:  


     No Known Drug Allergies (Verified  Allergy, Mild, 17)





PMhx/Soc


History of Surgery:  Yes (R Ankle Metal Plate Placed)


Anesthesia Reaction:  No


Hx Neurological Disorder:  No


Hx Respiratory Disorders:  No


Hx Cardiac Disorders:  No


Hx Psychiatric Problems:  No


Hx Miscellaneous Medical Probl:  Yes (Gallstones)


Hx Alcohol Use:  No


Hx Substance Use:  No


Hx Tobacco Use:  No





Physical Exam


Vitals





Vital Signs








  Date Time  Temp Pulse Resp B/P Pulse Ox O2 Delivery O2 Flow Rate FiO2


 


17 14:20 98.0 69 20 115/72 97 Room Air  


 


17 11:04 98.2 76 18 118/76 98   








Physical Exam


Const:               Alert, anxious, oriented to person place and time.


Head:   Atraumatic 


Eyes:    Normal Conjunctiva


ENT:    Normal External Ears, Nose and Mouth.


Neck:               Full range of motion..~ No meningismus.


Resp:    Clear to auscultation bilaterally.  No wheezing, rhonchi or crackles.  

Patient is talking in complete sentences.


Cardio:    Regular rate and rhythm, no murmurs


Abd:    Soft, non tender, non distended. Normal bowel sounds


Skin:    No petechiae or rashes


Back:    No midline or flank tenderness


Ext:    No cyanosis, or edema


Neur:    Awake and alert


Psych:    Normal Mood and Affect


Result Diagram:  


17 1200                                                                   

             17 1200





Results 24 hrs





 Laboratory Tests








Test


  17


11:56 17


12:00


 


Bedside Urine pH (LAB) 7.5  


 


Bedside Urine Protein (LAB) Negative  


 


Bedside Urine Glucose (UA) Negative  


 


Bedside Urine Ketones (LAB) Negative  


 


Bedside Urine Blood Negative  


 


Bedside Urine Nitrite (LAB) Negative  


 


Bedside Urine Leukocyte


Esterase (L 1+ 


  


 


 


White Blood Count  9.710^3/ul 


 


Red Blood Count  4.4010^6/ul 


 


Hemoglobin  13.8g/dl 


 


Hematocrit  41.1% 


 


Mean Corpuscular Volume  93.4fl 


 


Mean Corpuscular Hemoglobin  31.4pg 


 


Mean Corpuscular Hemoglobin


Concent 


  33.6g/dl 


 


 


Red Cell Distribution Width  12.4% 


 


Platelet Count  05406^3/UL 


 


Mean Platelet Volume  10.7fl 


 


Neutrophils %  70.7% 


 


Lymphocytes %  22.7% 


 


Monocytes %  5.4% 


 


Eosinophils %  0.6% 


 


Basophils %  0.3% 


 


Nucleated Red Blood Cells %  0.0/100WBC 


 


Neutrophils #  6.810^3/ul 


 


Lymphocytes #  2.210^3/ul 


 


Monocytes #  0.510^3/ul 


 


Eosinophils #  0.110^3/ul 


 


Basophils #  0.010^3/ul 


 


Nucleated Red Blood Cells #  0.010^3/ul 


 


Sodium Level  137mmol/L 


 


Potassium Level  3.7mmol/L 


 


Chloride Level  103mmol/L 


 


Carbon Dioxide Level  26mmol/L 


 


Anion Gap  12 


 


Blood Urea Nitrogen  13mg/dl 


 


Creatinine  0.76mg/dl 


 


Glucose Level  95mg/dl 


 


Calcium Level  9.5mg/dl 


 


Troponin I  < 0.012ng/ml 








 Current Medications








 Medications


  (Trade)  Dose


 Ordered  Sig/Maxim


 Route


 PRN Reason  Start Time


 Stop Time Status Last Admin


Dose Admin


 


 Sodium Chloride


  (NS)  1,000 ml @ 


 1,000 mls/hr  Q1H STAT


 IV


   17 11:32


 17 12:31 DC 17 12:02


 











Procedures/MDM


[place] [id number]


 Radiology Main Line: [phone]





 DIAGNOSTIC IMAGING REPORT





 Patient: RAFITA HUNG   : 1990   Age: 27  Sex: F                      

  


 MR #:    [id number]   Acct #:   [id number]    DOS: 17 1132


 Ordering MD: ERIC CASTRO NP   Location:  [facility]   Room/Bed:                    

                        


 








PROCEDURE:   Chest Radiograph.


 


CLINICAL INDICATION:    Syncope


 


TECHNIQUE:   Single frontal chest radiograph. 


 


COMPARISON:  None available


 


FINDINGS:


 


The cardiomediastinal silhouette is within normal limits.   No infiltrate or 

effusion is seen.    The bones are intact.  


 


IMPRESSION:


1.  Unremarkable chest radiograph.    


 





EKG: As interpreted by myself and Dr. De León


Rate/Rhythm:             Sinus bradycardia with sinus arrhythmia with heart 

rate 59 bpm


QRS, ST, T-waves:    No changes consistent w/ acute ischemia


Impression:      No evidence of ischemia or arrhythmia





MDM: This is a 27-year-old female presenting to the emergency department for 

shortness of breath and heart palpitations starting earlier today after 

drinking an energy drink.  Currently no chest pain, shortness of breath or 

difficulty breathing.  Patient states she has intermittent heart palpitations.  

Patient reports she is sensitive to caffeine and normally drinks an energy 

drink in the morning and 2 cups of coffee in the evening.  EKG shows sinus 

bradycardia with sinus arrhythmia with heart rate 59 bpm.  Urine dip shows 1+ 

leukocyte esterase.  Urine pregnancy is negative.  Labs drawn and IV access 

obtained.  Patient given 1L IV fluid bolus.  Labs are unremarkable.  No 

significant anemia or infection.  No significant electrolyte imbalance.  

Troponin is negative.  Chest x-ray reviewed by radiologist as unremarkable.  

Discussed findings with patient.  Patient states she is feeling much better.  

Nw denies chest pain, heart palpitations, cough or shortness of breath.





Low suspicion for acute myocardial infection, CVA, intracranial hemorrhage or 

acute cardiopulmonary etiology. 


Patient's diagnosis is UTI and Anxiety. 





Patient will be given prescription for Macrobid.  Instructed patient to follow 

up with PCP in the next 2-3 days for reassessment. Discussed with patient that 

she may need referral to psychiatry or psychology.  Return to ED for any new or 

worsening symptoms.  Patient verbalizes understanding.  All questions answered 

at discharge.





Departure


Diagnosis:  


 Primary Impression:  


 UTI (urinary tract infection)


 Urinary tract infection type:  acute cystitis  Hematuria presence:  without 

hematuria  Qualified Code:  N30.00 - Acute cystitis without hematuria


 Additional Impression:  


 Anxiety


Condition:  ERIC Culver NP 2017 12:41

## 2017-07-12 NOTE — RADRPT
PROCEDURE:   Chest Radiograph.

 

CLINICAL INDICATION:    Syncope

 

TECHNIQUE:   Single frontal chest radiograph. 

 

COMPARISON:  None available

 

FINDINGS:

 

The cardiomediastinal silhouette is within normal limits.   No infiltrate or effusion is seen.    Th
e bones are intact.  

 

IMPRESSION:

1.  Unremarkable chest radiograph.    

 

RPTAT: KK

_____________________________________________ 

.Billy Evans MD, MD           Date    Time 

Electronically viewed and signed by .Billy Evans MD, MD on 07/12/2017 13:37 

 

D:  07/12/2017 13:37  T:  07/12/2017 13:37

.B/

## 2018-02-10 ENCOUNTER — HOSPITAL ENCOUNTER (EMERGENCY)
Age: 28
LOS: 1 days | Discharge: HOME | End: 2018-02-11

## 2018-02-10 ENCOUNTER — HOSPITAL ENCOUNTER (EMERGENCY)
Dept: HOSPITAL 91 - FTE | Age: 28
LOS: 1 days | Discharge: HOME | End: 2018-02-11
Payer: COMMERCIAL

## 2018-02-10 DIAGNOSIS — L72.3: Primary | ICD-10-CM

## 2018-02-10 PROCEDURE — 99283 EMERGENCY DEPT VISIT LOW MDM: CPT

## 2018-03-09 ENCOUNTER — HOSPITAL ENCOUNTER (EMERGENCY)
Dept: HOSPITAL 54 - ER | Age: 28
Discharge: LEFT BEFORE DISCHARGE | End: 2018-03-09
Payer: COMMERCIAL

## 2018-03-09 DIAGNOSIS — Z53.21: Primary | ICD-10-CM

## 2018-03-09 PROCEDURE — Z7610: HCPCS

## 2018-03-09 PROCEDURE — A4606 OXYGEN PROBE USED W OXIMETER: HCPCS

## 2018-03-19 ENCOUNTER — HOSPITAL ENCOUNTER (EMERGENCY)
Dept: HOSPITAL 91 - FTE | Age: 28
LOS: 1 days | Discharge: HOME | End: 2018-03-20
Payer: COMMERCIAL

## 2018-03-19 ENCOUNTER — HOSPITAL ENCOUNTER (EMERGENCY)
Age: 28
LOS: 1 days | Discharge: HOME | End: 2018-03-20

## 2018-03-19 DIAGNOSIS — J20.9: Primary | ICD-10-CM

## 2018-03-19 PROCEDURE — 99283 EMERGENCY DEPT VISIT LOW MDM: CPT

## 2018-03-19 PROCEDURE — 81025 URINE PREGNANCY TEST: CPT

## 2018-03-20 RX ADMIN — KETOROLAC TROMETHAMINE 1 MG: 30 INJECTION, SOLUTION INTRAMUSCULAR at 02:45

## 2018-03-20 RX ADMIN — ACETAMINOPHEN 1 MG: 325 TABLET, FILM COATED ORAL at 02:41

## 2018-03-23 ENCOUNTER — HOSPITAL ENCOUNTER (EMERGENCY)
Dept: HOSPITAL 91 - FTE | Age: 28
Discharge: HOME | End: 2018-03-23
Payer: COMMERCIAL

## 2018-03-23 ENCOUNTER — HOSPITAL ENCOUNTER (EMERGENCY)
Age: 28
Discharge: HOME | End: 2018-03-23

## 2018-03-23 DIAGNOSIS — J02.9: Primary | ICD-10-CM

## 2018-03-23 PROCEDURE — 87880 STREP A ASSAY W/OPTIC: CPT

## 2018-03-23 PROCEDURE — 99283 EMERGENCY DEPT VISIT LOW MDM: CPT

## 2018-04-25 ENCOUNTER — HOSPITAL ENCOUNTER (EMERGENCY)
Age: 28
Discharge: HOME | End: 2018-04-25

## 2018-04-25 ENCOUNTER — HOSPITAL ENCOUNTER (EMERGENCY)
Dept: HOSPITAL 91 - FTE | Age: 28
Discharge: HOME | End: 2018-04-25
Payer: COMMERCIAL

## 2018-04-25 DIAGNOSIS — J06.9: Primary | ICD-10-CM

## 2018-04-25 DIAGNOSIS — J02.9: ICD-10-CM

## 2018-04-25 DIAGNOSIS — B34.9: ICD-10-CM

## 2018-04-25 PROCEDURE — 99283 EMERGENCY DEPT VISIT LOW MDM: CPT

## 2018-04-25 PROCEDURE — 81025 URINE PREGNANCY TEST: CPT

## 2018-04-25 RX ADMIN — ACETAMINOPHEN 1 MG: 500 TABLET, FILM COATED ORAL at 17:11

## 2018-06-06 ENCOUNTER — HOSPITAL ENCOUNTER (EMERGENCY)
Dept: HOSPITAL 91 - FTE | Age: 28
Discharge: HOME | End: 2018-06-06
Payer: COMMERCIAL

## 2018-06-06 ENCOUNTER — HOSPITAL ENCOUNTER (EMERGENCY)
Age: 28
Discharge: HOME | End: 2018-06-06

## 2018-06-06 DIAGNOSIS — J00: Primary | ICD-10-CM

## 2018-06-06 DIAGNOSIS — L29.9: ICD-10-CM

## 2018-06-06 PROCEDURE — 99284 EMERGENCY DEPT VISIT MOD MDM: CPT

## 2018-06-09 ENCOUNTER — HOSPITAL ENCOUNTER (EMERGENCY)
Dept: HOSPITAL 91 - FTE | Age: 28
LOS: 1 days | Discharge: HOME | End: 2018-06-10
Payer: COMMERCIAL

## 2018-06-09 ENCOUNTER — HOSPITAL ENCOUNTER (EMERGENCY)
Age: 28
LOS: 1 days | Discharge: HOME | End: 2018-06-10

## 2018-06-09 DIAGNOSIS — J40: Primary | ICD-10-CM

## 2018-06-09 PROCEDURE — 99283 EMERGENCY DEPT VISIT LOW MDM: CPT

## 2018-07-24 ENCOUNTER — HOSPITAL ENCOUNTER (EMERGENCY)
Dept: HOSPITAL 91 - FTE | Age: 28
Discharge: HOME | End: 2018-07-24
Payer: COMMERCIAL

## 2018-07-24 ENCOUNTER — HOSPITAL ENCOUNTER (EMERGENCY)
Age: 28
Discharge: HOME | End: 2018-07-24

## 2018-07-24 DIAGNOSIS — F41.9: Primary | ICD-10-CM

## 2018-07-24 LAB
ADD UMIC: YES
UR AMORPHOUS CRYSTAL: (no result) /HPF
UR ASCORBIC ACID: 20 MG/DL
UR BACTERIA: (no result) /HPF
UR BILIRUBIN (DIP): NEGATIVE MG/DL
UR BLOOD (DIP): (no result) MG/DL
UR CLARITY: (no result)
UR COLOR: YELLOW
UR GLUCOSE (DIP): NEGATIVE MG/DL
UR KETONES (DIP): NEGATIVE MG/DL
UR LEUKOCYTE ESTERASE (DIP): (no result) LEU/UL
UR NITRITE (DIP): NEGATIVE MG/DL
UR PH (DIP): 7 (ref 5–9)
UR RBC: 8 /HPF (ref 0–5)
UR SPECIFIC GRAVITY (DIP): 1.02 (ref 1–1.03)
UR SQUAMOUS EPITHELIAL CELL: (no result) /HPF
UR TOTAL PROTEIN (DIP): NEGATIVE MG/DL
UR UROBILINOGEN (DIP): NEGATIVE MG/DL
UR WBC: 80 /HPF (ref 0–5)

## 2018-07-24 PROCEDURE — 81025 URINE PREGNANCY TEST: CPT

## 2018-07-24 PROCEDURE — 71045 X-RAY EXAM CHEST 1 VIEW: CPT

## 2018-07-24 PROCEDURE — 81001 URINALYSIS AUTO W/SCOPE: CPT

## 2018-07-24 PROCEDURE — 99284 EMERGENCY DEPT VISIT MOD MDM: CPT

## 2019-01-28 ENCOUNTER — HOSPITAL ENCOUNTER (EMERGENCY)
Dept: HOSPITAL 91 - FTE | Age: 29
Discharge: HOME | End: 2019-01-28
Payer: COMMERCIAL

## 2019-01-28 ENCOUNTER — HOSPITAL ENCOUNTER (EMERGENCY)
Dept: HOSPITAL 10 - FTE | Age: 29
Discharge: HOME | End: 2019-01-28
Payer: COMMERCIAL

## 2019-01-28 VITALS
HEIGHT: 66 IN | WEIGHT: 201.72 LBS | WEIGHT: 201.72 LBS | HEIGHT: 66 IN | BODY MASS INDEX: 32.42 KG/M2 | BODY MASS INDEX: 32.42 KG/M2

## 2019-01-28 VITALS — DIASTOLIC BLOOD PRESSURE: 87 MMHG | HEART RATE: 100 BPM | RESPIRATION RATE: 20 BRPM | SYSTOLIC BLOOD PRESSURE: 124 MMHG

## 2019-01-28 DIAGNOSIS — F41.9: Primary | ICD-10-CM

## 2019-01-28 PROCEDURE — 99282 EMERGENCY DEPT VISIT SF MDM: CPT

## 2019-01-28 NOTE — ERD
ER Documentation


Chief Complaint


Chief Complaint





Complains of feeling a bit of anxiety Hx of anxiety





HPI


Patient is a 28-year-old female with a history of anxiety who presents with 


anxiety.  She said that over the past few days it has been tough to sleep.  She 


says that she has been giving the generic Ativan in the past.  She has no 


suicidal or homicidal ideation at this time.  She brought HER-2 daughters in for


ear pain and decided that she would check in as well to get checked out.





ROS


All systems reviewed and are negative except as per history of present illness.





Medications


Home Meds


Active Scripts


Lorazepam* (Ativan*) 0.5 Mg Tablet, 0.5 MG PO Q8H PRN for ANXIETY, #10 TAB


   Prov:MICHELE LOW DO         7/24/18


Azithromycin* (Zithromax*) 250 Mg Tablet, 250 MG PO .ZPACK AS DIRECTED, #6 TAB


   TAKE 500 MG (2 TABS) THE FIRST DAY THEN 250 MG (1 TAB) DAYS 2-5


   Prov:ZHOU CORONA PA-C         6/10/18


Hydrocortisone* Topical (Hydrocortisone* Topical) 2.5%-28.3 Gm Cream..g., 1 


APPLIC TOP BID, #1 TUB


   Prov:RANJANA MARIA PA-C         6/6/18


Albuterol Sulfate* (Proair HFA*) 8.5 Gm Hfa.aer.ad, 2 PUFF INH Q4, #1 INHALER


   Prov:RANJANA MARIA PA-C         6/6/18


Sodium Chloride (Saline Nasal Mist) 126 Ml Mist, 1 SPRAY NASAL DAILY PRN for 


NASAL CONGESTION for 5 Days, BOTTLE


   Prov:RANJANA MARIA PA-C         6/6/18


Dextromethorphan Hb-Promethazine Hcl* (Promethazine DM* Syrup) 473 Ml Syrup, 5 


ML PO Q6 PRN for COUGH for 5 Days, ML


   Prov:RANJANA MARIA PA-C         6/6/18


Acetaminophen* (Tylophen*) 500 Mg Capsule, 500 MG PO Q6H PRN for PAIN LEVEL 1-5 


for 7 Days, TAB


   Prov:RANJANA MARIA PA-C         4/25/18


Fluticasone Propionate (Flonase Allergy Relief) 9.9 Ml Altura.susp, 1 SPRAY NASAL


BID, #1 BOTTLE


   TO EACH NOSTRIL


   Prov:RANJANA MARIA PA-C         4/25/18


Phenol* (Chloraseptic* Spray) 177 Ml Altura.pump, 2 SPRAY MT Q2H PRN for SORE 


THROAT, #1 BOTTLE


   Prov:HAMILTON SNOW PA-C         3/23/18


Azithromycin* (Zithromax*) 250 Mg Tablet, 250 MG PO .ZPACK AS DIRECTED, #6 TAB


   TAKE 500 MG (2 TABS) THE FIRST DAY THEN 250 MG (1 TAB) DAYS 2-5


   Prov:DWAYNE HERNANDES NP         3/20/18


Acetaminophen* (Tylophen*) 500 Mg Capsule, 1 CAP PO Q6H PRN for PAIN AND OR 


ELEVATED TEMP, #20 CAP


   Prov:DWAYNE HERNANDES NP         3/20/18


Ibuprofen* (Motrin*) 600 Mg Tab, 600 MG PO Q6H PRN for PAIN AND OR ELEVATED 


TEMP, #30 TAB


   Prov:DWAYNE HERNANDES NP         3/20/18


Cetirizine Hcl* (Zyrtec*) 10 Mg Capsule, 10 MG PO DAILY, #30 TAB.CHEW


   Prov:DWAYNE HERNANDES NP         3/20/18


Guaifenesin-D-Methorphan Hb* (Guaifenesin* DM Syrup) 120 Ml Syrup, 10 ML PO Q4H 


PRN for COUGH, #120 ML


   Prov:DWAYNE HERNANDES NP         3/20/18


Ibuprofen* (Motrin*) 600 Mg Tab, 600 MG PO Q6H PRN for PAIN AND OR ELEVATED 


TEMP, #30 TAB


   Prov:YAZMIN CARLSON NP         2/11/18


Cephalexin* (Keflex*) 500 Mg Capsule, 500 MG PO QID for 5 Days, CAP


   Prov:YAZMIN CARLSON NP         2/11/18


Nitrofurantoin Monohyd Macrocr (Macrobid) 100 Mg Capsr, 100 MG PO BID for 5 


Days, CAP


   Prov:ERIC CASTRO NP         7/12/17





Allergies


Allergies:  


Coded Allergies:  


     No Known Drug Allergies (Verified  Allergy, Mild, 3/19/18)





PMhx/Soc


History of Surgery:  Yes (right ankle)


Anesthesia Reaction:  No


Hx Neurological Disorder:  No


Hx Respiratory Disorders:  No


Hx Cardiac Disorders:  No


Hx Psychiatric Problems:  No


Hx Miscellaneous Medical Probl:  Yes (Gallstones)


Hx Alcohol Use:  No


Hx Substance Use:  No


Hx Tobacco Use:  No


Smoking Status:  Never smoker





FmHx


Family History:  No diabetes





Physical Exam


Vitals





Vital Signs


  Date      Temp  Pulse  Resp  B/P (MAP)   Pulse Ox  O2          O2 Flow    FiO2


Time                                                 Delivery    Rate


   1/28/19  98.0     88    19      132/86        98  Room Air


     20:11                          (101)


   1/28/19  98.4    100    20      124/87        98


     18:04                           (99)





Physical Exam


Const:   No acute distress


Head:   Atraumatic 


Eyes:    Normal Conjunctiva


ENT:    Normal External Ears, Nose and Mouth.


Neck:               Full range of motion. No meningismus.


Resp:   Clear to auscultation bilaterally


Cardio:   Regular rate and rhythm, no murmurs


Abd:    Soft, non tender, non distended. Normal bowel sounds


Skin:   No petechiae or rashes


Back:   No midline or flank tenderness


Ext:    No cyanosis, or edema


Neur:   Awake and alert


Psych:    Normal Mood and Affect no suicidal or homicidal ideation





Procedures/MDM


Patient is a 28-year-old female who presents with anxiety.  She does not require


further workup or a 5150 hold at this time.  I do not believe that giving her 


benzodiazepine medications is good for her especially given that she takes care 


of 3 young children.  The patient would likely benefit from being on an SSRI 


which could be prescribed by a primary doctor.  I told her to discuss this with 


a primary doctor.  She can return for any worsening symptoms.





Departure


Diagnosis:  


   Primary Impression:  


   Anxiety


Condition:  Fair


Patient Instructions:  Panic Attack


Referrals:  


Your doctor





Additional Instructions:  


Call your primary care doctor TOMORROW for an appointment during the next 1 


WEEK.Tell the  that you were referred from this facility.See the doctor


sooner or return here if your  condition worsens before your appointment time. 





Discuss with your doctor possible treatment with an SSRI medication.











MERCY GLASS MD                Jan 28, 2019 20:35

## 2019-03-27 ENCOUNTER — HOSPITAL ENCOUNTER (EMERGENCY)
Dept: HOSPITAL 91 - FTE | Age: 29
Discharge: HOME | End: 2019-03-27
Payer: COMMERCIAL

## 2019-03-27 ENCOUNTER — HOSPITAL ENCOUNTER (EMERGENCY)
Dept: HOSPITAL 10 - FTE | Age: 29
Discharge: HOME | End: 2019-03-27
Payer: COMMERCIAL

## 2019-03-27 VITALS — HEART RATE: 84 BPM | RESPIRATION RATE: 18 BRPM | DIASTOLIC BLOOD PRESSURE: 69 MMHG | SYSTOLIC BLOOD PRESSURE: 121 MMHG

## 2019-03-27 VITALS
WEIGHT: 210.1 LBS | BODY MASS INDEX: 35.87 KG/M2 | BODY MASS INDEX: 35.87 KG/M2 | WEIGHT: 210.1 LBS | HEIGHT: 64 IN | HEIGHT: 64 IN

## 2019-03-27 DIAGNOSIS — F41.9: Primary | ICD-10-CM

## 2019-03-27 PROCEDURE — 93005 ELECTROCARDIOGRAM TRACING: CPT

## 2019-03-27 PROCEDURE — 81025 URINE PREGNANCY TEST: CPT

## 2019-03-27 PROCEDURE — 99283 EMERGENCY DEPT VISIT LOW MDM: CPT

## 2019-03-27 PROCEDURE — 82962 GLUCOSE BLOOD TEST: CPT

## 2019-03-27 NOTE — ERD
ER Documentation


Chief Complaint


Chief Complaint





dizziness, fatigued, x 3 days; on depovera; denies any med prob





HPI


Patient is a 29-year-old female with anxiety and gastritis who presents with 


fatigue.  She has had fatigue for the past 3 days.  She said that it is gotten 


worse.  She had loose diarrhea for 2 days with 3 episodes.  There is no blood.  


She had one episode of vomiting today with no blood.  She felt lightheaded.  She


stopped her Paxil 2 days ago because she ran out.





ROS


All systems reviewed and are negative except as per history of present illness.





Medications


Home Meds


Active Scripts


Paroxetine Hcl* (Paxil*) 20 Mg Tablet, 20 MG PO HS, #30 TAB


   Prov:MERCY GLASS MD         3/27/19


Lorazepam* (Ativan*) 0.5 Mg Tablet, 0.5 MG PO Q8H PRN for ANXIETY, #10 TAB


   Prov:MICHELE LOW DO         7/24/18


Azithromycin* (Zithromax*) 250 Mg Tablet, 250 MG PO .ZPACK AS DIRECTED, #6 TAB


   TAKE 500 MG (2 TABS) THE FIRST DAY THEN 250 MG (1 TAB) DAYS 2-5


   Prov:ZHOU CORONA PA-C         6/10/18


Hydrocortisone* Topical (Hydrocortisone* Topical) 2.5%-28.3 Gm Cream..g., 1 


APPLIC TOP BID, #1 TUB


   Prov:RANJANA MARIA PA-C         6/6/18


Albuterol Sulfate* (Proair HFA*) 8.5 Gm Hfa.aer.ad, 2 PUFF INH Q4, #1 INHALER


   Prov:RANJANA MARIA PA-C         6/6/18


Sodium Chloride (Saline Nasal Mist) 126 Ml Mist, 1 SPRAY NASAL DAILY PRN for 


NASAL CONGESTION for 5 Days, BOTTLE


   Prov:RANJANA MARIA PA-C         6/6/18


Dextromethorphan Hb-Promethazine Hcl* (Promethazine DM* Syrup) 473 Ml Syrup, 5 


ML PO Q6 PRN for COUGH for 5 Days, ML


   Prov:RANJANA MARIA PA-C         6/6/18


Acetaminophen* (Tylophen*) 500 Mg Capsule, 500 MG PO Q6H PRN for PAIN LEVEL 1-5 


for 7 Days, TAB


   Prov:RANJANA MARIA PA-C         4/25/18


Fluticasone Propionate (Flonase Allergy Relief) 9.9 Ml [place].susp, 1 SPRAY NASAL


BID, #1 BOTTLE


   TO EACH NOSTRIL


   Prov:RANJANA MARIA PA-C         4/25/18


Phenol* (Chloraseptic* Spray) 177 Ml [place].pump, 2 SPRAY MT Q2H PRN for SORE 


THROAT, #1 BOTTLE


   Prov:HAMILTON NSOW PA-C         3/23/18


Azithromycin* (Zithromax*) 250 Mg Tablet, 250 MG PO .ZPACK AS DIRECTED, #6 TAB


   TAKE 500 MG (2 TABS) THE FIRST DAY THEN 250 MG (1 TAB) DAYS 2-5


   Prov:DWAYNE HERNANDES NP         3/20/18


Acetaminophen* (Tylophen*) 500 Mg Capsule, 1 CAP PO Q6H PRN for PAIN AND OR 


ELEVATED TEMP, #20 CAP


   Prov:DWAYNE HERNANDES NP         3/20/18


Ibuprofen* (Motrin*) 600 Mg Tab, 600 MG PO Q6H PRN for PAIN AND OR ELEVATED 


TEMP, #30 TAB


   Prov:DWAYNE HERNANDES NP         3/20/18


Cetirizine Hcl* (Zyrtec*) 10 Mg Capsule, 10 MG PO DAILY, #30 TAB.CHEW


   Prov:DWAYNE HERNANDES NP         3/20/18


Guaifenesin-D-Methorphan Hb* (Guaifenesin* DM Syrup) 120 Ml Syrup, 10 ML PO Q4H 


PRN for COUGH, #120 ML


   Prov:DWAYNE HERNANDES NP         3/20/18


Ibuprofen* (Motrin*) 600 Mg Tab, 600 MG PO Q6H PRN for PAIN AND OR ELEVATED 


TEMP, #30 TAB


   Prov:YAZMIN CARLSON NP         2/11/18


Cephalexin* (Keflex*) 500 Mg Capsule, 500 MG PO QID for 5 Days, CAP


   Prov:YAZMIN CARLSON NP         2/11/18


Nitrofurantoin Monohyd Macrocr (Macrobid) 100 Mg Capsr, 100 MG PO BID for 5 


Days, CAP


   Prov:ERIC CASTRO NP         7/12/17





Allergies


Allergies:  


Coded Allergies:  


     No Known Drug Allergies (Verified  Allergy, Mild, 3/19/18)





PMhx/Soc


History of Surgery:  Yes (right ankle)


Anesthesia Reaction:  No


Hx Neurological Disorder:  No


Hx Respiratory Disorders:  No


Hx Cardiac Disorders:  No


Hx Psychiatric Problems:  No


Hx Miscellaneous Medical Probl:  Yes (Gallstones)


Hx Alcohol Use:  No


Hx Substance Use:  No


Hx Tobacco Use:  No





FmHx


Family History:  diabetes





Physical Exam


Vitals





Vital Signs


  Date      Temp  Pulse  Resp  B/P (MAP)   Pulse Ox  O2          O2 Flow    FiO2


Time                                                 Delivery    Rate


   3/27/19  98.3     84    18      121/69        98  Room Air


     17:55                           (86)


   3/27/19  98.9     91    20      127/66        99


     14:42                           (86)





Physical Exam


Const:   No acute distress


Head:   Atraumatic 


Eyes:    Normal Conjunctiva


ENT:    Normal External Ears, Nose and Mouth.


Neck:               Full range of motion. No meningismus.


Resp:   Clear to auscultation bilaterally


Cardio:   Regular rate and rhythm, no murmurs


Abd:    Soft, non tender, non distended. Normal bowel sounds


Skin:   No petechiae or rashes


Back:   No midline or flank tenderness


Ext:    No cyanosis, or edema


Neur:   Awake and alert


Psych:    Normal Mood and Affect


Results 24 hrs





Laboratory Tests


            Test
                      3/27/19
17:27  3/27/19
17:28


            Bedside Glucose                89 mg/dL


            POC Beta HCG, Qualitative                 NEGATIVE








Procedures/MDM


EKG read by me: 


Rate/Rhythm: Tachycardia at a rate of 102


Intervals:    Normal


Impression:     Sinus tachycardia without ischemia





Accu-Chek is normal.  Pregnancy test is negative.





Patient is a 29-year-old female who presents with multiple complaints.  I 


believe she has anxiety related to stopping her Paxil 2 days ago.  EKG, 


Accu-Chek, and pregnancy test are all negative.  I doubt serious electrolyte 


abnormality or anemia.  Vital signs are normal.  I doubt hypo-or hyperglycemia. 


I doubt pregnancy.  I doubt significant EKG abnormalities or arrhythmias.  The 


patient went to follow-up closely with her primary doctor within 1 week.  She 


can return for any worsening symptoms.





Departure


Diagnosis:  


   Primary Impression:  


   Anxiety


Condition:  Fair


Patient Instructions:  Anxiety Reaction


Referrals:  


Your doctor





Additional Instructions:  


Call your primary care doctor TOMORROW for an appointment during the next 1 


WEEK.Tell the  that you were referred from this facility.See the doctor


sooner or return here if your  condition worsens before your appointment time.











MERCY GLASS MD                Mar 27, 2019 17:44

## 2019-04-27 ENCOUNTER — HOSPITAL ENCOUNTER (EMERGENCY)
Dept: HOSPITAL 91 - FTE | Age: 29
Discharge: LEFT BEFORE DISCHARGE | End: 2019-04-27
Payer: COMMERCIAL

## 2019-04-27 ENCOUNTER — HOSPITAL ENCOUNTER (EMERGENCY)
Dept: HOSPITAL 10 - FTE | Age: 29
Discharge: LEFT BEFORE DISCHARGE | End: 2019-04-27
Payer: COMMERCIAL

## 2019-04-27 VITALS
HEIGHT: 64 IN | WEIGHT: 218.48 LBS | BODY MASS INDEX: 37.3 KG/M2 | HEIGHT: 64 IN | WEIGHT: 218.48 LBS | BODY MASS INDEX: 37.3 KG/M2

## 2019-04-27 VITALS — DIASTOLIC BLOOD PRESSURE: 80 MMHG | RESPIRATION RATE: 18 BRPM | HEART RATE: 84 BPM | SYSTOLIC BLOOD PRESSURE: 161 MMHG

## 2019-04-27 DIAGNOSIS — N64.4: Primary | ICD-10-CM

## 2019-04-27 DIAGNOSIS — Z76.0: ICD-10-CM

## 2019-04-27 DIAGNOSIS — J02.9: ICD-10-CM

## 2019-04-27 PROCEDURE — 76642 ULTRASOUND BREAST LIMITED: CPT

## 2019-04-27 PROCEDURE — 87880 STREP A ASSAY W/OPTIC: CPT

## 2019-04-27 PROCEDURE — 96372 THER/PROPH/DIAG INJ SC/IM: CPT

## 2019-04-27 PROCEDURE — 81025 URINE PREGNANCY TEST: CPT

## 2019-04-27 PROCEDURE — 99285 EMERGENCY DEPT VISIT HI MDM: CPT

## 2019-04-27 RX ADMIN — KETOROLAC TROMETHAMINE 1 MG: 30 INJECTION, SOLUTION INTRAMUSCULAR at 14:17

## 2019-04-30 NOTE — ERD
ER Documentation


Chief Complaint


Chief Complaint





ST, fevers, L ear pain, L axillary bump, L breast pain X 5 days





HPI


29-year-old female patient with no significant past medical history presents the


ED complaining of sore throat, fever, left ear pain, left axilla, left breast 


pain that started about 5 days ago.  Patient also reports that she takes 


medications for anxiety like a refill as her appointment is 12 days away, would 


like medication to hold her over until she sees her clinical physician.  Denies 


any chest pain, shortness of breath, nausea, vomiting, diarrhea, neck stiffness,


abdominal pain.





ROS


All systems reviewed and are negative except as per history of present illness.





Medications


Home Meds


Active Scripts


Benzonatate* (Tessalon Perle*) 100 Mg Capsule, 100 MG PO Q8H PRN for COUGH, #20 


CAP


   Prov:JAMILA HENDERSON PA-C         4/27/19


Phenol* (Chloraseptic* Spray) 177 Ml [place].pump, 2 SPRAY MT Q2H PRN for SORE 


THROAT, #1 BOTTLE


   Prov:JAMILA HENDERSON PA-C         4/27/19


Ibuprofen* (Motrin*) 600 Mg Tab, 600 MG PO Q6, #30 TAB


   Prov:JAMILA HENDERSON PA-C         4/27/19


Lorazepam* (Lorazepam*) 1 Mg Tablet, 1 MG PO Q8, #10 TAB


   Prov:JAMILA HENDERSON PA-C         4/27/19


Paroxetine Hcl* (Paxil*) 20 Mg Tablet, 20 MG PO HS, #30 TAB


   Prov:MERCY GLASS MD         3/27/19


Lorazepam* (Ativan*) 0.5 Mg Tablet, 0.5 MG PO Q8H PRN for ANXIETY, #10 TAB


   Prov:MICHELE LOW DO         7/24/18


Azithromycin* (Zithromax*) 250 Mg Tablet, 250 MG PO .ZPACK AS DIRECTED, #6 TAB


   TAKE 500 MG (2 TABS) THE FIRST DAY THEN 250 MG (1 TAB) DAYS 2-5


   Prov:ZHOU CORONA PA-C         6/10/18


Hydrocortisone* Topical (Hydrocortisone* Topical) 2.5%-28.3 Gm Cream..g., 1 


APPLIC TOP BID, #1 TUB


   Prov:RANJANA MARIA PA-C         6/6/18


Albuterol Sulfate* (Proair HFA*) 8.5 Gm Hfa.aer.ad, 2 PUFF INH Q4, #1 INHALER


   Prov:RANJANA MARIA PA-C         6/6/18


Sodium Chloride (Saline Nasal Mist) 126 Ml Mist, 1 SPRAY NASAL DAILY PRN for 


NASAL CONGESTION for 5 Days, BOTTLE


   Prov:RANJANA MARIA PA-C         6/6/18


Dextromethorphan Hb-Promethazine Hcl* (Promethazine DM* Syrup) 473 Ml Syrup, 5 


ML PO Q6 PRN for COUGH for 5 Days, ML


   Prov:RANJANA MARIA PA-C         6/6/18


Acetaminophen* (Tylophen*) 500 Mg Capsule, 500 MG PO Q6H PRN for PAIN LEVEL 1-5 


for 7 Days, TAB


   Prov:RANJANA MARIA PA-C         4/25/18


Fluticasone Propionate (Flonase Allergy Relief) 9.9 Ml [place].susp, 1 SPRAY NASAL


BID, #1 BOTTLE


   TO EACH NOSTRIL


   Prov:RANJANA MARIA PA-C         4/25/18


Phenol* (Chloraseptic* Spray) 177 Ml [place].pump, 2 SPRAY MT Q2H PRN for SORE 


THROAT, #1 BOTTLE


   Prov:HAMILTON SNOW PA-C         3/23/18


Azithromycin* (Zithromax*) 250 Mg Tablet, 250 MG PO .ZPACK AS DIRECTED, #6 TAB


   TAKE 500 MG (2 TABS) THE FIRST DAY THEN 250 MG (1 TAB) DAYS 2-5


   Prov:DWAYNE HERNANDES NP         3/20/18


Acetaminophen* (Tylophen*) 500 Mg Capsule, 1 CAP PO Q6H PRN for PAIN AND OR 


ELEVATED TEMP, #20 CAP


   Prov:DWAYNE HERNANDES NP         3/20/18


Ibuprofen* (Motrin*) 600 Mg Tab, 600 MG PO Q6H PRN for PAIN AND OR ELEVATED 


TEMP, #30 TAB


   Prov:DWAYNE HERNANDES NP         3/20/18


Cetirizine Hcl* (Zyrtec*) 10 Mg Capsule, 10 MG PO DAILY, #30 TAB.CHEW


   Prov:DWAYNE HERNANDES NP         3/20/18


Guaifenesin-D-Methorphan Hb* (Guaifenesin* DM Syrup) 120 Ml Syrup, 10 ML PO Q4H 


PRN for COUGH, #120 ML


   Prov:CHIDILORAIA,DWAYNE GOMEZ TStephen ARMSTRONG         3/20/18


Ibuprofen* (Motrin*) 600 Mg Tab, 600 MG PO Q6H PRN for PAIN AND OR ELEVATED 


TEMP, #30 TAB


   Prov:YAZMIN CARLSON NP         2/11/18


Cephalexin* (Keflex*) 500 Mg Capsule, 500 MG PO QID for 5 Days, CAP


   Prov:YAZMIN CARLSON NP         2/11/18


Nitrofurantoin Monohyd Macrocr (Macrobid) 100 Mg Capsr, 100 MG PO BID for 5 


Days, CAP


   Prov:ERIC CASTRO NP         7/12/17





Allergies


Allergies:  


Coded Allergies:  


     No Known Drug Allergies (Verified  Allergy, Mild, 4/27/19)





PMhx/Soc


History of Surgery:  Yes (right ankle)


Anesthesia Reaction:  No


Hx Neurological Disorder:  No


Hx Respiratory Disorders:  No


Hx Cardiac Disorders:  No


Hx Psychiatric Problems:  No


Hx Miscellaneous Medical Probl:  Yes (Gallstones)


Hx Alcohol Use:  No


Hx Substance Use:  No


Hx Tobacco Use:  No


Smoking Status:  Never smoker





FmHx


Family History:  No diabetes, No coronary disease





Physical Exam


Vitals





Vital Signs


  Date      Temp  Pulse  Resp  B/P (MAP)   Pulse Ox  O2          O2 Flow    FiO2


Time                                                 Delivery    Rate


   4/27/19  98.6     84    18      161/80        98


     12:06                          (107)





Physical Exam


Const: Non-ill-appearing, well-nourished. In no acute distress.


Head: Atraumatic, normocephalic 


Eyes: Normal Conjunctiva without injection. No purulent discharge. PERRL. EOMI 


ENT: Normal external ear. Ear canal without erythema. Tympanic membrane pearly 


gray without effusion or bulging. Nasal canal clear with normal turbinates. 


Moist oropharynx without tonsillar exudates. Non-erythematous pharynx. Uvula 


midline. No drooling.  No trismus. 


Neck: Full range of motion. No meningismus. No cervical lymphadenopathy. 


Resp: Clear to auscultation bilaterally. No wheezing, rhonchi, rales, or 


crackles. No accessory muscle use. No retractions.


Cardio: Regular rate and rhythm.  No murmurs, rubs or gallops.


Abd: Soft, non tender, non distended. Normal bowel sounds.  No palpable masses. 


No rebound tenderness.  No guarding.  


Skin: No petechiae or rashes


Back: No midline tenderness. No CVA tenderness.


Ext: No cyanosis, or edema. 


Neur: Awake and alert. 


Psych: Normal Mood and Affect


Results 24 hrs





Laboratory Tests


                    Test
                      4/27/19
13:56


                    POC Beta HCG, Qualitative  NEGATIVE





Current Medications


 Medications
   Dose
          Sig/Maxim
       Start Time
   Status  Last


 (Trade)       Ordered        Route
 PRN     Stop Time              Admin
Dose


                              Reason                                Admin


 Ketorolac
     60 mg          ONCE  STAT
    4/27/19       DC           4/27/19


Tromethamine
                 IM
            13:35
                       14:17



 (Toradol)                                   4/27/19 13:37








Procedures/MDM


21-year-old female patient with no significant past medical history presents to 


ED complaining of sore throat, fever, left ear pain, lump on patient's left 


axilla and pain in her breasts started about 5 days ago.  Patient is afebrile 


and nontoxic-appearing.  Patient's blood pressure is 161/80.  Blood Pressure 


Assessment: Patient's blood pressure was elevated (>120/80) but appears stable 


without evidence of hypertension emergency or urgency.  The patient was 


counseled about the risks of hypertension and urged to pursue outpatient 


monitoring and therapy within a week with their primary care physician.





Patient is appropriate for outpatient antibiotics. Patient's physical exam 


include lungs which were clear to auscultation and a normal pulse oximetry. 


Bilateral ears pearly grey.  No tenderness to palpation of tragus or mastoid.  


Low suspicion for mastoiditis, otitis externa, otitis media. Patient is speaking


in full sentences. There is a low suspicion for pneumonia, epiglottitis, croup, 


sinusitis, peritonsillar abscess, hands foot mouth disease, scarlet fever, 


Kawasaki disease, Casey's angina, retropharyngeal abscess, meningitis, sepsis, 


acute abdomen or other emergent conditions.





IMPRESSION:


 


1.  Lobulated circumscribed 1.5 cm hypoechoic solid appearing mass at the 9 


o'clock left breast.


2.  Subcutaneous 9 mm complex cysts in the left axilla likely a sebaceous cyst.


3.  Recommend diagnostic mammography to further evaluate. 


 


BIRADS 0 (Incomplete - need additional imaging evaluation).





Patient has a lobulated circumscribed 1.5 cm hypoechoic solid appearing mass at 


9 o'clock left breast. Differential diagnosis considered include but is not 


limited to fibroadenoma, cyst, fibrocystic changes, malignancy.  Low suspicion 


for mastitis, deep space infection, sepsis, cellulitis, or other emergent 


conditions. 





Diagnosis: Breast Pain, Sore Throat, Encounter for medication refill,


Discharge medications: Tessalon Perles, Chloraseptic Spray, Ativan, Ibuprofen


Follow up with primary care physician in 1-2 days. Instructed patient to return 


to the ED sooner for any worsening symptoms. Patient's questions were answered. 


Patient is hemodynamically stable. Patient understood and agreed with discharge 


plan. Patient discharged stable..  Discussed differentials





Disclaimer: Inadvertent spelling and grammatical errors are likely due to 


EHR/dictation software use and do not reflect on the overall quality of patient 


care. Also, please note that the electronic time recorded on this note does not 


necessarily reflect the actual time of the patient encounter.


Pain, encounter





Departure


Diagnosis:  


   Primary Impression:  


   Breast pain


   Additional Impressions:  


   Encounter for medication refill


   Sore throat


Condition:  Stable


Patient Instructions:  Breast Self-Exam (BSE), Mammography, Self-Care for Sore 


Throats, Anxiety Reaction, Breast Mass, Uncertain Cause, Viral Syndrome (Adult)


Referrals:  


[facility]


YOU HAVE RECEIVED A MEDICAL SCREENING EXAM AND THE RESULTS INDICATE THAT YOU DO 


NOT HAVE A CONDITION THAT REQUIRES URGENT TREATMENT IN THE EMERGENCY DEPARTMENT.





FURTHER EVALUATION AND TREATMENT OF YOUR CONDITION CAN WAIT UNTIL YOU ARE SEEN 


IN YOUR DOCTORS OFFICE WITHIN THE NEXT 1-2 DAYS. IT IS YOUR RESPONSIBILITY TO 


MAKE AN APPOINTMENT FOR FOLOW-UP CARE.





IF YOU HAVE A PRIMARY DOCTOR


--you should call your primary doctor and schedule an appointment





IF YOU DO NOT HAVE A PRIMARY DOCTOR YOU CAN CALL OUR PHYSICIAN REFERRAL HOTLINE 


AT


 [phone] 





IF YOU CAN NOT AFFORD TO SEE A PHYSICIAN YOU CAN CHOSE FROM THE FOLLOWING 


[facility] CLINICS





[facility] [phone] [address] [facility] [phone] [address] [facility]. [facility] [phone] [address] [facility] [phone] [address] [facility] [phone]


[facility]. [facility]. [phone]


[address] [facility]. [facility]


YOU HAVE RECEIVED A MEDICAL SCREENING EXAM AND THE RESULTS INDICATE THAT YOU DO 


NOT HAVE A CONDITION THAT REQUIRES URGENT TREATMENT IN THE EMERGENCY DEPARTMENT.





FURTHER EVALUATION AND TREATMENT OF YOUR CONDITION CAN WAIT UNTIL YOU ARE SEEN 


IN YOUR DOCTORS OFFICE WITHIN THE NEXT 1-2 DAYS. IT IS YOUR RESPONSIBILITY TO 


MAKE AN APPOINTMENT FOR FOLOW-UP CARE.





IF YOU HAVE A PRIMARY DOCTOR


--you should call your primary doctor and schedule and appointment





IF YOU DO NOT HAVE A PRIMARY DOCTOR YOU CAN CALL OUR PHYSICIAN REFERRAL HOTLINE 


AT [phone].





IF YOU CAN NOT AFFORD TO SEE A PHYSICIAN YOU CAN CHOSE FROM THE FOLLOWING [facility]


INSTITUTIONS:





[facility]


[address] [place] [address]





[facility]


[address] [place], [place] [id number] [place] [address]


[address] [place] [address]





[facility] URGENT CARE/SPECIALTIES





Additional Instructions:  


Call your primary care doctor TOMORROW for an appointment during the next 2-3 


days.See the doctor sooner or return here if your condition worsens before your 


appointment time.











JAMILA HENDERSON PA-C              Apr 30, 2019 19:16